# Patient Record
Sex: FEMALE | Race: BLACK OR AFRICAN AMERICAN | NOT HISPANIC OR LATINO | URBAN - METROPOLITAN AREA
[De-identification: names, ages, dates, MRNs, and addresses within clinical notes are randomized per-mention and may not be internally consistent; named-entity substitution may affect disease eponyms.]

---

## 2017-03-14 ENCOUNTER — INPATIENT (INPATIENT)
Facility: HOSPITAL | Age: 82
LOS: 7 days | Discharge: NURSING, REHAB OR LONG-TERM CARE | End: 2017-03-22
Attending: HOSPITALIST | Admitting: HOSPITALIST
Payer: MEDICAID

## 2017-03-14 VITALS
RESPIRATION RATE: 20 BRPM | SYSTOLIC BLOOD PRESSURE: 170 MMHG | OXYGEN SATURATION: 100 % | DIASTOLIC BLOOD PRESSURE: 105 MMHG | HEART RATE: 59 BPM | TEMPERATURE: 90 F

## 2017-03-14 DIAGNOSIS — Z41.8 ENCOUNTER FOR OTHER PROCEDURES FOR PURPOSES OTHER THAN REMEDYING HEALTH STATE: ICD-10-CM

## 2017-03-14 DIAGNOSIS — T68.XXXA HYPOTHERMIA, INITIAL ENCOUNTER: ICD-10-CM

## 2017-03-14 DIAGNOSIS — G93.41 METABOLIC ENCEPHALOPATHY: ICD-10-CM

## 2017-03-14 PROBLEM — Z00.00 ENCOUNTER FOR PREVENTIVE HEALTH EXAMINATION: Status: ACTIVE | Noted: 2017-03-14

## 2017-03-14 LAB
ALBUMIN SERPL ELPH-MCNC: 3.7 G/DL — SIGNIFICANT CHANGE UP (ref 3.3–5)
ALP SERPL-CCNC: 120 U/L — SIGNIFICANT CHANGE UP (ref 40–120)
ALT FLD-CCNC: 53 U/L — HIGH (ref 4–33)
AMPHET UR-MCNC: NEGATIVE — SIGNIFICANT CHANGE UP
AMPHET UR-MCNC: NEGATIVE — SIGNIFICANT CHANGE UP
APAP SERPL-MCNC: < 15 UG/ML — LOW (ref 15–25)
APPEARANCE UR: CLEAR — SIGNIFICANT CHANGE UP
AST SERPL-CCNC: 51 U/L — HIGH (ref 4–32)
B PERT DNA SPEC QL NAA+PROBE: SIGNIFICANT CHANGE UP
BACTERIA # UR AUTO: SIGNIFICANT CHANGE UP
BARBITURATES MEASUREMENT: NEGATIVE — SIGNIFICANT CHANGE UP
BARBITURATES UR SCN-MCNC: NEGATIVE — SIGNIFICANT CHANGE UP
BARBITURATES UR SCN-MCNC: NEGATIVE — SIGNIFICANT CHANGE UP
BASE EXCESS BLDA CALC-SCNC: 5.3 MMOL/L — SIGNIFICANT CHANGE UP
BASE EXCESS BLDV CALC-SCNC: 5.4 MMOL/L — SIGNIFICANT CHANGE UP
BASOPHILS # BLD AUTO: 0 K/UL — SIGNIFICANT CHANGE UP (ref 0–0.2)
BASOPHILS NFR BLD AUTO: 0 % — SIGNIFICANT CHANGE UP (ref 0–2)
BENZODIAZ SERPL-MCNC: NEGATIVE — SIGNIFICANT CHANGE UP
BENZODIAZ UR-MCNC: NEGATIVE — SIGNIFICANT CHANGE UP
BENZODIAZ UR-MCNC: NEGATIVE — SIGNIFICANT CHANGE UP
BILIRUB SERPL-MCNC: 0.2 MG/DL — SIGNIFICANT CHANGE UP (ref 0.2–1.2)
BILIRUB UR-MCNC: NEGATIVE — SIGNIFICANT CHANGE UP
BLOOD GAS VENOUS - CREATININE: 0.62 MG/DL — SIGNIFICANT CHANGE UP (ref 0.5–1.3)
BLOOD UR QL VISUAL: HIGH
BUN SERPL-MCNC: 25 MG/DL — HIGH (ref 7–23)
BUN SERPL-MCNC: 25 MG/DL — HIGH (ref 7–23)
C PNEUM DNA SPEC QL NAA+PROBE: NOT DETECTED — SIGNIFICANT CHANGE UP
CA-I BLDA-SCNC: 1.29 MMOL/L — SIGNIFICANT CHANGE UP (ref 1.15–1.29)
CALCIUM SERPL-MCNC: 9.5 MG/DL — SIGNIFICANT CHANGE UP (ref 8.4–10.5)
CALCIUM SERPL-MCNC: 9.5 MG/DL — SIGNIFICANT CHANGE UP (ref 8.4–10.5)
CANNABINOIDS UR-MCNC: NEGATIVE — SIGNIFICANT CHANGE UP
CANNABINOIDS UR-MCNC: NEGATIVE — SIGNIFICANT CHANGE UP
CHLORIDE BLDV-SCNC: 110 MMOL/L — HIGH (ref 96–108)
CHLORIDE SERPL-SCNC: 111 MMOL/L — HIGH (ref 98–107)
CHLORIDE SERPL-SCNC: 113 MMOL/L — HIGH (ref 98–107)
CK SERPL-CCNC: 101 U/L — SIGNIFICANT CHANGE UP (ref 25–170)
CO2 SERPL-SCNC: 23 MMOL/L — SIGNIFICANT CHANGE UP (ref 22–31)
CO2 SERPL-SCNC: 26 MMOL/L — SIGNIFICANT CHANGE UP (ref 22–31)
COCAINE METAB.OTHER UR-MCNC: NEGATIVE — SIGNIFICANT CHANGE UP
COCAINE METAB.OTHER UR-MCNC: NEGATIVE — SIGNIFICANT CHANGE UP
COLOR SPEC: SIGNIFICANT CHANGE UP
CORTIS SERPL-MCNC: 14.2 UG/DL — SIGNIFICANT CHANGE UP (ref 2.7–18.4)
CORTIS SERPL-MCNC: 19.7 UG/DL — HIGH (ref 2.7–18.4)
CREAT SERPL-MCNC: 0.67 MG/DL — SIGNIFICANT CHANGE UP (ref 0.5–1.3)
CREAT SERPL-MCNC: 0.71 MG/DL — SIGNIFICANT CHANGE UP (ref 0.5–1.3)
EOSINOPHIL # BLD AUTO: 0.01 K/UL — SIGNIFICANT CHANGE UP (ref 0–0.5)
EOSINOPHIL NFR BLD AUTO: 0.3 % — SIGNIFICANT CHANGE UP (ref 0–6)
ETHANOL BLD-MCNC: < 10 MG/DL — SIGNIFICANT CHANGE UP
FLUAV H1 2009 PAND RNA SPEC QL NAA+PROBE: NOT DETECTED — SIGNIFICANT CHANGE UP
FLUAV H1 RNA SPEC QL NAA+PROBE: NOT DETECTED — SIGNIFICANT CHANGE UP
FLUAV H3 RNA SPEC QL NAA+PROBE: NOT DETECTED — SIGNIFICANT CHANGE UP
FLUAV SUBTYP SPEC NAA+PROBE: SIGNIFICANT CHANGE UP
FLUBV RNA SPEC QL NAA+PROBE: NOT DETECTED — SIGNIFICANT CHANGE UP
GAS PNL BLDV: 147 MMOL/L — HIGH (ref 136–146)
GLUCOSE BLDA-MCNC: 148 MG/DL — HIGH (ref 70–99)
GLUCOSE BLDV-MCNC: 127 — HIGH (ref 70–99)
GLUCOSE SERPL-MCNC: 124 MG/DL — HIGH (ref 70–99)
GLUCOSE SERPL-MCNC: 136 MG/DL — HIGH (ref 70–99)
GLUCOSE UR-MCNC: 1000 — HIGH
GRAN CASTS # UR COMP ASSIST: SIGNIFICANT CHANGE UP
HADV DNA SPEC QL NAA+PROBE: NOT DETECTED — SIGNIFICANT CHANGE UP
HCO3 BLDA-SCNC: 28 MMOL/L — HIGH (ref 22–26)
HCO3 BLDV-SCNC: 27 MMOL/L — SIGNIFICANT CHANGE UP (ref 20–27)
HCOV 229E RNA SPEC QL NAA+PROBE: NOT DETECTED — SIGNIFICANT CHANGE UP
HCOV HKU1 RNA SPEC QL NAA+PROBE: NOT DETECTED — SIGNIFICANT CHANGE UP
HCOV NL63 RNA SPEC QL NAA+PROBE: NOT DETECTED — SIGNIFICANT CHANGE UP
HCOV OC43 RNA SPEC QL NAA+PROBE: NOT DETECTED — SIGNIFICANT CHANGE UP
HCT VFR BLD CALC: 44.9 % — SIGNIFICANT CHANGE UP (ref 34.5–45)
HCT VFR BLDA CALC: 39.4 % — SIGNIFICANT CHANGE UP (ref 34.5–46.5)
HCT VFR BLDV CALC: 44.9 % — SIGNIFICANT CHANGE UP (ref 34.5–45)
HGB BLD-MCNC: 14.6 G/DL — SIGNIFICANT CHANGE UP (ref 11.5–15.5)
HGB BLDA-MCNC: 12.8 G/DL — SIGNIFICANT CHANGE UP (ref 11.5–15.5)
HGB BLDV-MCNC: 14.6 G/DL — SIGNIFICANT CHANGE UP (ref 11.5–15.5)
HMPV RNA SPEC QL NAA+PROBE: NOT DETECTED — SIGNIFICANT CHANGE UP
HPIV1 RNA SPEC QL NAA+PROBE: NOT DETECTED — SIGNIFICANT CHANGE UP
HPIV2 RNA SPEC QL NAA+PROBE: NOT DETECTED — SIGNIFICANT CHANGE UP
HPIV3 RNA SPEC QL NAA+PROBE: NOT DETECTED — SIGNIFICANT CHANGE UP
HPIV4 RNA SPEC QL NAA+PROBE: NOT DETECTED — SIGNIFICANT CHANGE UP
HYALINE CASTS # UR AUTO: SIGNIFICANT CHANGE UP (ref 0–?)
IMM GRANULOCYTES NFR BLD AUTO: 0.3 % — SIGNIFICANT CHANGE UP (ref 0–1.5)
KETONES UR-MCNC: NEGATIVE — SIGNIFICANT CHANGE UP
LACTATE BLDA-SCNC: 1.1 MMOL/L — SIGNIFICANT CHANGE UP (ref 0.5–2)
LACTATE BLDV-MCNC: 1.7 MMOL/L — SIGNIFICANT CHANGE UP (ref 0.5–2)
LEUKOCYTE ESTERASE UR-ACNC: NEGATIVE — SIGNIFICANT CHANGE UP
LYMPHOCYTES # BLD AUTO: 0.43 K/UL — LOW (ref 1–3.3)
LYMPHOCYTES # BLD AUTO: 13 % — SIGNIFICANT CHANGE UP (ref 13–44)
M PNEUMO DNA SPEC QL NAA+PROBE: NOT DETECTED — SIGNIFICANT CHANGE UP
MCHC RBC-ENTMCNC: 28.7 PG — SIGNIFICANT CHANGE UP (ref 27–34)
MCHC RBC-ENTMCNC: 32.5 % — SIGNIFICANT CHANGE UP (ref 32–36)
MCV RBC AUTO: 88.4 FL — SIGNIFICANT CHANGE UP (ref 80–100)
METHADONE UR-MCNC: NEGATIVE — SIGNIFICANT CHANGE UP
METHADONE UR-MCNC: NEGATIVE — SIGNIFICANT CHANGE UP
MONOCYTES # BLD AUTO: 0.17 K/UL — SIGNIFICANT CHANGE UP (ref 0–0.9)
MONOCYTES NFR BLD AUTO: 5.1 % — SIGNIFICANT CHANGE UP (ref 2–14)
MUCOUS THREADS # UR AUTO: SIGNIFICANT CHANGE UP
NEUTROPHILS # BLD AUTO: 2.69 K/UL — SIGNIFICANT CHANGE UP (ref 1.8–7.4)
NEUTROPHILS NFR BLD AUTO: 81.3 % — HIGH (ref 43–77)
NITRITE UR-MCNC: NEGATIVE — SIGNIFICANT CHANGE UP
NON-SQ EPI CELLS # UR AUTO: <1 — SIGNIFICANT CHANGE UP
OPIATES UR-MCNC: NEGATIVE — SIGNIFICANT CHANGE UP
OPIATES UR-MCNC: NEGATIVE — SIGNIFICANT CHANGE UP
OXYCODONE UR-MCNC: NEGATIVE — SIGNIFICANT CHANGE UP
OXYCODONE UR-MCNC: NEGATIVE — SIGNIFICANT CHANGE UP
PCO2 BLDA: 60 MMHG — HIGH (ref 32–48)
PCO2 BLDV: 72 MMHG — HIGH (ref 41–51)
PCP UR-MCNC: NEGATIVE — SIGNIFICANT CHANGE UP
PCP UR-MCNC: NEGATIVE — SIGNIFICANT CHANGE UP
PH BLDA: 7.33 PH — LOW (ref 7.35–7.45)
PH BLDV: 7.27 PH — LOW (ref 7.32–7.43)
PH UR: 6.5 — SIGNIFICANT CHANGE UP (ref 4.6–8)
PLATELET # BLD AUTO: 127 K/UL — LOW (ref 150–400)
PMV BLD: 11.6 FL — SIGNIFICANT CHANGE UP (ref 7–13)
PO2 BLDA: 169 MMHG — HIGH (ref 83–108)
PO2 BLDV: 52 MMHG — HIGH (ref 35–40)
POTASSIUM BLDA-SCNC: 4.7 MMOL/L — HIGH (ref 3.4–4.5)
POTASSIUM BLDV-SCNC: 7.9 MMOL/L — CRITICAL HIGH (ref 3.4–4.5)
POTASSIUM SERPL-MCNC: 5.1 MMOL/L — SIGNIFICANT CHANGE UP (ref 3.5–5.3)
POTASSIUM SERPL-MCNC: 6.4 MMOL/L — CRITICAL HIGH (ref 3.5–5.3)
POTASSIUM SERPL-SCNC: 5.1 MMOL/L — SIGNIFICANT CHANGE UP (ref 3.5–5.3)
POTASSIUM SERPL-SCNC: 6.4 MMOL/L — CRITICAL HIGH (ref 3.5–5.3)
PROT SERPL-MCNC: 7.2 G/DL — SIGNIFICANT CHANGE UP (ref 6–8.3)
PROT UR-MCNC: >600 — SIGNIFICANT CHANGE UP
RBC # BLD: 5.08 M/UL — SIGNIFICANT CHANGE UP (ref 3.8–5.2)
RBC # FLD: 13.3 % — SIGNIFICANT CHANGE UP (ref 10.3–14.5)
RBC CASTS # UR COMP ASSIST: SIGNIFICANT CHANGE UP (ref 0–?)
RSV RNA SPEC QL NAA+PROBE: NOT DETECTED — SIGNIFICANT CHANGE UP
RV+EV RNA SPEC QL NAA+PROBE: NOT DETECTED — SIGNIFICANT CHANGE UP
SALICYLATES SERPL-MCNC: < 5 MG/DL — LOW (ref 15–30)
SAO2 % BLDA: 99.1 % — HIGH (ref 95–99)
SAO2 % BLDV: 80.6 % — SIGNIFICANT CHANGE UP (ref 60–85)
SODIUM BLDA-SCNC: 143 MMOL/L — SIGNIFICANT CHANGE UP (ref 136–146)
SODIUM SERPL-SCNC: 148 MMOL/L — HIGH (ref 135–145)
SODIUM SERPL-SCNC: 150 MMOL/L — HIGH (ref 135–145)
SP GR SPEC: 1.02 — SIGNIFICANT CHANGE UP (ref 1–1.03)
SQUAMOUS # UR AUTO: SIGNIFICANT CHANGE UP
T3 SERPL-MCNC: 81.6 NG/DL — SIGNIFICANT CHANGE UP (ref 80–200)
T4 AB SER-ACNC: 5.34 UG/DL — SIGNIFICANT CHANGE UP (ref 5.1–13)
TSH SERPL-MCNC: 3.16 UIU/ML — SIGNIFICANT CHANGE UP (ref 0.27–4.2)
UROBILINOGEN FLD QL: NORMAL E.U. — SIGNIFICANT CHANGE UP (ref 0.1–0.2)
WBC # BLD: 3.31 K/UL — LOW (ref 3.8–10.5)
WBC # FLD AUTO: 3.31 K/UL — LOW (ref 3.8–10.5)
WBC UR QL: SIGNIFICANT CHANGE UP (ref 0–?)
YEAST BUDDING # UR COMP ASSIST: SIGNIFICANT CHANGE UP

## 2017-03-14 PROCEDURE — 71010: CPT | Mod: 26

## 2017-03-14 PROCEDURE — 70450 CT HEAD/BRAIN W/O DYE: CPT | Mod: 26

## 2017-03-14 RX ORDER — NOREPINEPHRINE BITARTRATE/D5W 8 MG/250ML
0.1 PLASTIC BAG, INJECTION (ML) INTRAVENOUS
Qty: 8 | Refills: 0 | Status: DISCONTINUED | OUTPATIENT
Start: 2017-03-14 | End: 2017-03-14

## 2017-03-14 RX ORDER — NOREPINEPHRINE BITARTRATE/D5W 8 MG/250ML
0.1 PLASTIC BAG, INJECTION (ML) INTRAVENOUS
Qty: 8 | Refills: 0 | Status: DISCONTINUED | OUTPATIENT
Start: 2017-03-14 | End: 2017-03-15

## 2017-03-14 RX ORDER — ENOXAPARIN SODIUM 100 MG/ML
40 INJECTION SUBCUTANEOUS DAILY
Qty: 0 | Refills: 0 | Status: DISCONTINUED | OUTPATIENT
Start: 2017-03-14 | End: 2017-03-15

## 2017-03-14 RX ORDER — VANCOMYCIN HCL 1 G
1000 VIAL (EA) INTRAVENOUS ONCE
Qty: 0 | Refills: 0 | Status: COMPLETED | OUTPATIENT
Start: 2017-03-14 | End: 2017-03-14

## 2017-03-14 RX ORDER — INFLUENZA VIRUS VACCINE 15; 15; 15; 15 UG/.5ML; UG/.5ML; UG/.5ML; UG/.5ML
0.5 SUSPENSION INTRAMUSCULAR ONCE
Qty: 0 | Refills: 0 | Status: DISCONTINUED | OUTPATIENT
Start: 2017-03-14 | End: 2017-03-16

## 2017-03-14 RX ORDER — SODIUM CHLORIDE 9 MG/ML
1000 INJECTION, SOLUTION INTRAVENOUS
Qty: 0 | Refills: 0 | Status: DISCONTINUED | OUTPATIENT
Start: 2017-03-14 | End: 2017-03-15

## 2017-03-14 RX ORDER — PIPERACILLIN AND TAZOBACTAM 4; .5 G/20ML; G/20ML
3.38 INJECTION, POWDER, LYOPHILIZED, FOR SOLUTION INTRAVENOUS ONCE
Qty: 0 | Refills: 0 | Status: COMPLETED | OUTPATIENT
Start: 2017-03-14 | End: 2017-03-14

## 2017-03-14 RX ORDER — PIPERACILLIN AND TAZOBACTAM 4; .5 G/20ML; G/20ML
3.38 INJECTION, POWDER, LYOPHILIZED, FOR SOLUTION INTRAVENOUS EVERY 8 HOURS
Qty: 0 | Refills: 0 | Status: DISCONTINUED | OUTPATIENT
Start: 2017-03-14 | End: 2017-03-18

## 2017-03-14 RX ORDER — VANCOMYCIN HCL 1 G
1000 VIAL (EA) INTRAVENOUS EVERY 24 HOURS
Qty: 0 | Refills: 0 | Status: DISCONTINUED | OUTPATIENT
Start: 2017-03-15 | End: 2017-03-18

## 2017-03-14 RX ORDER — SODIUM CHLORIDE 9 MG/ML
500 INJECTION INTRAMUSCULAR; INTRAVENOUS; SUBCUTANEOUS ONCE
Qty: 0 | Refills: 0 | Status: COMPLETED | OUTPATIENT
Start: 2017-03-14 | End: 2017-03-14

## 2017-03-14 RX ORDER — SODIUM CHLORIDE 9 MG/ML
1000 INJECTION INTRAMUSCULAR; INTRAVENOUS; SUBCUTANEOUS ONCE
Qty: 0 | Refills: 0 | Status: COMPLETED | OUTPATIENT
Start: 2017-03-14 | End: 2017-03-14

## 2017-03-14 RX ADMIN — PIPERACILLIN AND TAZOBACTAM 200 GRAM(S): 4; .5 INJECTION, POWDER, LYOPHILIZED, FOR SOLUTION INTRAVENOUS at 12:41

## 2017-03-14 RX ADMIN — Medication 10.16 MICROGRAM(S)/KG/MIN: at 23:32

## 2017-03-14 RX ADMIN — Medication 250 MILLIGRAM(S): at 14:15

## 2017-03-14 RX ADMIN — SODIUM CHLORIDE 75 MILLILITER(S): 9 INJECTION, SOLUTION INTRAVENOUS at 19:31

## 2017-03-14 RX ADMIN — SODIUM CHLORIDE 75 MILLILITER(S): 9 INJECTION, SOLUTION INTRAVENOUS at 23:33

## 2017-03-14 RX ADMIN — PIPERACILLIN AND TAZOBACTAM 25 GRAM(S): 4; .5 INJECTION, POWDER, LYOPHILIZED, FOR SOLUTION INTRAVENOUS at 23:32

## 2017-03-14 RX ADMIN — SODIUM CHLORIDE 1000 MILLILITER(S): 9 INJECTION INTRAMUSCULAR; INTRAVENOUS; SUBCUTANEOUS at 17:08

## 2017-03-14 RX ADMIN — SODIUM CHLORIDE 500 MILLILITER(S): 9 INJECTION INTRAMUSCULAR; INTRAVENOUS; SUBCUTANEOUS at 12:41

## 2017-03-14 RX ADMIN — ENOXAPARIN SODIUM 40 MILLIGRAM(S): 100 INJECTION SUBCUTANEOUS at 23:34

## 2017-03-14 NOTE — ED PROVIDER NOTE - ATTENDING CONTRIBUTION TO CARE
DR Bloch- patient brought in with AMS, no medical hx except chronic constipation, Last see okay yeasterday. No fever as per son, no c/o. Bp190/80, pulse 58 with rectal temp 86. Awake, following directions, mumbling answers to questions. Motor intact, pulses 2 + and equal, heart sounds nml lungs clear, abd soft nontender, no organomegaly- Plan ct head , warm, labs, culture antibiotics , consider occult sepsis, hypothyroidism.

## 2017-03-14 NOTE — ED ADULT NURSE NOTE - OBJECTIVE STATEMENT
pt BIBEMS responsive to only pain and loud verbal. as per son, patient is normally ambulatory and awake alert X4. only PMH as per son is DM. LNW yesterday at 1400. patient presents today AMS, maintaining own airway, cold to touch, no resp distress, no NVD, LS clear = bilat. EMS stated that pt had L sided weakness however upon arrival to ED pt is able to move all extremities upon command (loud verbal) and appears to have strong , no facial droop noted however pt is aphasic.  Bilateral 20 g IV placed, rectal remp 84.5, critical ochoa inserted. will CTM.

## 2017-03-14 NOTE — ED PROVIDER NOTE - NEUROLOGICAL, MLM
Pt able to follow commands, move all extremities, no focal deficits, no motor or sensory deficits noted on exam.

## 2017-03-14 NOTE — ED ADULT NURSE REASSESSMENT NOTE - NS ED NURSE REASSESS COMMENT FT1
Patient left unit at this time. RN, ED Tech, and family transporting with patient. Patient in NAD at this time.

## 2017-03-14 NOTE — ED PROVIDER NOTE - NOTES
neuro paged for consult neuro resident will see patient in ED, ICU also consulted regarding patients status

## 2017-03-14 NOTE — ED PROVIDER NOTE - PROGRESS NOTE DETAILS
JEAN Mendoza: called from blood gas regarding pts VBG, K 7.9. Reviewed EKG with Dr. Bloch no evidence of hyperkalemia on EKG. Will await CMP. Likely hemolyzed specimen. DR Bloch- KELSY 4.7 on ABG, Noed pco2 72, ABG 60, with pH of 7.33, appears acute on chronic resp acidosis. Concern for myxedema, , discussed with ICU,  will wait for TSH for hydrocortisone and treatment if hypothyroid. Continuing to rewarm , went up 1 degree celcius in 1 hour. JEAN Mendoza : MAR and Alice PENA txt paged. JEAN Mendoza : MAR and Tele PA txt paged. MAR phone paged at [phone] JEAN Mendoza: pt admitted to tele service JEAN Mendoza on discussion with ICU and admin patient better suited for ICU at this point given hypothermia and beir hugger. Pt accepted to ICU

## 2017-03-14 NOTE — H&P ADULT. - RS GEN PE MLT RESP DETAILS PC
respirations non-labored/clear to auscultation bilaterally/good air movement/breath sounds equal/airway patent

## 2017-03-14 NOTE — H&P ADULT. - HISTORY OF PRESENT ILLNESS
86 F pmhx of headaches and constipation presenting with AMS. History was obtained from son as bedside. Patient was in her usual state of health, until a few days ago when family noticed patient had a slurred speech. The speech impairment resolved on its own, so family did not bring the patient to the hospital. Patient returned to her baseline. At baseline, patient is quite active and ambulatory. This morning, she woke up with confusion. As per son, patient was acting erratic. Speech was mumbled. Patient was making statements such as "I want to be cremated." 911 was called and patient was brought to the ED.   In the ED, T 89.9, /105 HR 59 RR 16 SpO2 100 RA. In the ED, patient was given vancomycin and zosyn and 1.5 L bolus. Due to hypothermia, patient was admitted to the MICU.

## 2017-03-14 NOTE — H&P ADULT. - PROBLEM SELECTOR PLAN 1
likely secondary to infectious process. UA neg, CXR neg for PNA. RVP neg  - Blood and urine cultures pending  - Broad antibiotic coverage: vancomycin and zosyn  - MRI head pending   - Urine legionella pending  - LR maintanence fluids likely secondary to infectious process vs. CVA. UA neg, CXR neg for PNA. RVP neg  - Blood and urine cultures pending  - Broad antibiotic coverage: vancomycin and zosyn  - MRI head pending   - Urine legionella pending  - LR fluids

## 2017-03-14 NOTE — H&P ADULT. - ASSESSMENT
86 F pmhx of headaches and constipation presenting with metabolic encephalopathy, hypothermia, bradycardia likely secondary to infectious process. UA neg, CXR neg for PNA.

## 2017-03-14 NOTE — ED PROVIDER NOTE - OBJECTIVE STATEMENT
87 yo F PMHx of headaches, constipation, here for AMS. Pt brought in by EMS with son at bedside giving history. Son reports that yesterday patient was at baseline, however when he went to wake her this morning she was confused saying things like "I want to be cremated". Called 911 and brought to ED for eval. Son denies travel, recent infections, or sick contacts. Denies hx of UTI or PNA.  Follows with PMD last seen in summer 2016. Denies recent fever, vomiting, diarrhea.

## 2017-03-14 NOTE — H&P ADULT. - PROBLEM SELECTOR PLAN 2
likely secondary to sepsis 2/2 infectious process. Concern for myxedema, however TSH wnl   - Will continue to monitor  - Hyperthermia blanket  - T3 pending

## 2017-03-14 NOTE — ED ADULT NURSE REASSESSMENT NOTE - NS ED NURSE REASSESS COMMENT FT1
pt appears to be not improving s/p 2nd maalox, pt does not wish to eat states she is too naueated. Dr Bloch aware

## 2017-03-14 NOTE — ED PROVIDER NOTE - MEDICAL DECISION MAKING DETAILS
87 yo F pmhx of constipation and headaches here for AMS. Found to have rectal temp of 86.9 in ED.  Code sepsis called. Will send labs and tx empirically and reassess.   PLAN: labs, CT, CXR, urine, abx.

## 2017-03-15 LAB
ALBUMIN SERPL ELPH-MCNC: 3.2 G/DL — LOW (ref 3.3–5)
ALP SERPL-CCNC: 99 U/L — SIGNIFICANT CHANGE UP (ref 40–120)
ALT FLD-CCNC: 40 U/L — HIGH (ref 4–33)
AST SERPL-CCNC: 37 U/L — HIGH (ref 4–32)
BASOPHILS # BLD AUTO: 0 K/UL — SIGNIFICANT CHANGE UP (ref 0–0.2)
BASOPHILS NFR BLD AUTO: 0 % — SIGNIFICANT CHANGE UP (ref 0–2)
BILIRUB SERPL-MCNC: 0.2 MG/DL — SIGNIFICANT CHANGE UP (ref 0.2–1.2)
BUN SERPL-MCNC: 24 MG/DL — HIGH (ref 7–23)
BUN SERPL-MCNC: 28 MG/DL — HIGH (ref 7–23)
CALCIUM SERPL-MCNC: 9 MG/DL — SIGNIFICANT CHANGE UP (ref 8.4–10.5)
CALCIUM SERPL-MCNC: 9.4 MG/DL — SIGNIFICANT CHANGE UP (ref 8.4–10.5)
CHLORIDE SERPL-SCNC: 115 MMOL/L — HIGH (ref 98–107)
CHLORIDE SERPL-SCNC: 116 MMOL/L — HIGH (ref 98–107)
CK MB BLD-MCNC: 8.97 NG/ML — HIGH (ref 1–4.7)
CK MB BLD-MCNC: 9.98 NG/ML — HIGH (ref 1–4.7)
CK MB BLD-MCNC: SIGNIFICANT CHANGE UP (ref 0–2.5)
CK SERPL-CCNC: 67 U/L — SIGNIFICANT CHANGE UP (ref 25–170)
CK SERPL-CCNC: 91 U/L — SIGNIFICANT CHANGE UP (ref 25–170)
CO2 SERPL-SCNC: 21 MMOL/L — LOW (ref 22–31)
CO2 SERPL-SCNC: 27 MMOL/L — SIGNIFICANT CHANGE UP (ref 22–31)
CREAT SERPL-MCNC: 1.11 MG/DL — SIGNIFICANT CHANGE UP (ref 0.5–1.3)
CREAT SERPL-MCNC: 1.23 MG/DL — SIGNIFICANT CHANGE UP (ref 0.5–1.3)
EOSINOPHIL # BLD AUTO: 0.01 K/UL — SIGNIFICANT CHANGE UP (ref 0–0.5)
EOSINOPHIL NFR BLD AUTO: 0.3 % — SIGNIFICANT CHANGE UP (ref 0–6)
GLUCOSE SERPL-MCNC: 72 MG/DL — SIGNIFICANT CHANGE UP (ref 70–99)
GLUCOSE SERPL-MCNC: 76 MG/DL — SIGNIFICANT CHANGE UP (ref 70–99)
HCT VFR BLD CALC: 37.3 % — SIGNIFICANT CHANGE UP (ref 34.5–45)
HGB BLD-MCNC: 12 G/DL — SIGNIFICANT CHANGE UP (ref 11.5–15.5)
IMM GRANULOCYTES NFR BLD AUTO: 0 % — SIGNIFICANT CHANGE UP (ref 0–1.5)
INR BLD: 1.08 — SIGNIFICANT CHANGE UP (ref 0.87–1.18)
LYMPHOCYTES # BLD AUTO: 0.37 K/UL — LOW (ref 1–3.3)
LYMPHOCYTES # BLD AUTO: 9.5 % — LOW (ref 13–44)
MAGNESIUM SERPL-MCNC: 1.9 MG/DL — SIGNIFICANT CHANGE UP (ref 1.6–2.6)
MCHC RBC-ENTMCNC: 28.4 PG — SIGNIFICANT CHANGE UP (ref 27–34)
MCHC RBC-ENTMCNC: 32.2 % — SIGNIFICANT CHANGE UP (ref 32–36)
MCV RBC AUTO: 88.4 FL — SIGNIFICANT CHANGE UP (ref 80–100)
MONOCYTES # BLD AUTO: 0.35 K/UL — SIGNIFICANT CHANGE UP (ref 0–0.9)
MONOCYTES NFR BLD AUTO: 9 % — SIGNIFICANT CHANGE UP (ref 2–14)
NEUTROPHILS # BLD AUTO: 3.15 K/UL — SIGNIFICANT CHANGE UP (ref 1.8–7.4)
NEUTROPHILS NFR BLD AUTO: 81.2 % — HIGH (ref 43–77)
PHOSPHATE SERPL-MCNC: 2.9 MG/DL — SIGNIFICANT CHANGE UP (ref 2.5–4.5)
PLATELET # BLD AUTO: 125 K/UL — LOW (ref 150–400)
PMV BLD: 12.5 FL — SIGNIFICANT CHANGE UP (ref 7–13)
POTASSIUM SERPL-MCNC: 4.8 MMOL/L — SIGNIFICANT CHANGE UP (ref 3.5–5.3)
POTASSIUM SERPL-MCNC: 5.7 MMOL/L — HIGH (ref 3.5–5.3)
POTASSIUM SERPL-SCNC: 4.8 MMOL/L — SIGNIFICANT CHANGE UP (ref 3.5–5.3)
POTASSIUM SERPL-SCNC: 5.7 MMOL/L — HIGH (ref 3.5–5.3)
PROT SERPL-MCNC: 5.8 G/DL — LOW (ref 6–8.3)
PROTHROM AB SERPL-ACNC: 12.3 SEC — SIGNIFICANT CHANGE UP (ref 10–13.1)
RBC # BLD: 4.22 M/UL — SIGNIFICANT CHANGE UP (ref 3.8–5.2)
RBC # FLD: 13.7 % — SIGNIFICANT CHANGE UP (ref 10.3–14.5)
SODIUM SERPL-SCNC: 149 MMOL/L — HIGH (ref 135–145)
SODIUM SERPL-SCNC: 152 MMOL/L — HIGH (ref 135–145)
SPECIMEN SOURCE: SIGNIFICANT CHANGE UP
TROPONIN T SERPL-MCNC: < 0.06 NG/ML — SIGNIFICANT CHANGE UP (ref 0–0.06)
TROPONIN T SERPL-MCNC: < 0.06 NG/ML — SIGNIFICANT CHANGE UP (ref 0–0.06)
VANCOMYCIN FLD-MCNC: 8.4 UG/ML — SIGNIFICANT CHANGE UP
WBC # BLD: 3.88 K/UL — SIGNIFICANT CHANGE UP (ref 3.8–10.5)
WBC # FLD AUTO: 3.88 K/UL — SIGNIFICANT CHANGE UP (ref 3.8–10.5)

## 2017-03-15 PROCEDURE — 71010: CPT | Mod: 26

## 2017-03-15 PROCEDURE — 99222 1ST HOSP IP/OBS MODERATE 55: CPT | Mod: GC

## 2017-03-15 PROCEDURE — 99233 SBSQ HOSP IP/OBS HIGH 50: CPT | Mod: GC

## 2017-03-15 RX ORDER — DEXTROSE 50 % IN WATER 50 %
50 SYRINGE (ML) INTRAVENOUS ONCE
Qty: 0 | Refills: 0 | Status: COMPLETED | OUTPATIENT
Start: 2017-03-15 | End: 2017-03-15

## 2017-03-15 RX ORDER — INSULIN HUMAN 100 [IU]/ML
10 INJECTION, SOLUTION SUBCUTANEOUS ONCE
Qty: 0 | Refills: 0 | Status: COMPLETED | OUTPATIENT
Start: 2017-03-15 | End: 2017-03-15

## 2017-03-15 RX ORDER — SODIUM CHLORIDE 9 MG/ML
1000 INJECTION, SOLUTION INTRAVENOUS
Qty: 0 | Refills: 0 | Status: DISCONTINUED | OUTPATIENT
Start: 2017-03-15 | End: 2017-03-15

## 2017-03-15 RX ORDER — IPRATROPIUM/ALBUTEROL SULFATE 18-103MCG
3 AEROSOL WITH ADAPTER (GRAM) INHALATION ONCE
Qty: 0 | Refills: 0 | Status: COMPLETED | OUTPATIENT
Start: 2017-03-15 | End: 2017-03-15

## 2017-03-15 RX ORDER — NOREPINEPHRINE BITARTRATE/D5W 8 MG/250ML
0.1 PLASTIC BAG, INJECTION (ML) INTRAVENOUS
Qty: 8 | Refills: 0 | Status: DISCONTINUED | OUTPATIENT
Start: 2017-03-15 | End: 2017-03-15

## 2017-03-15 RX ORDER — DEXTROSE 50 % IN WATER 50 %
10 SYRINGE (ML) INTRAVENOUS ONCE
Qty: 0 | Refills: 0 | Status: DISCONTINUED | OUTPATIENT
Start: 2017-03-15 | End: 2017-03-15

## 2017-03-15 RX ORDER — CALCIUM GLUCONATE 100 MG/ML
1 VIAL (ML) INTRAVENOUS ONCE
Qty: 0 | Refills: 0 | Status: COMPLETED | OUTPATIENT
Start: 2017-03-15 | End: 2017-03-15

## 2017-03-15 RX ORDER — HEPARIN SODIUM 5000 [USP'U]/ML
5000 INJECTION INTRAVENOUS; SUBCUTANEOUS EVERY 12 HOURS
Qty: 0 | Refills: 0 | Status: DISCONTINUED | OUTPATIENT
Start: 2017-03-15 | End: 2017-03-16

## 2017-03-15 RX ORDER — MIDODRINE HYDROCHLORIDE 2.5 MG/1
20 TABLET ORAL ONCE
Qty: 0 | Refills: 0 | Status: COMPLETED | OUTPATIENT
Start: 2017-03-15 | End: 2017-03-15

## 2017-03-15 RX ORDER — SODIUM CHLORIDE 9 MG/ML
1000 INJECTION INTRAMUSCULAR; INTRAVENOUS; SUBCUTANEOUS ONCE
Qty: 0 | Refills: 0 | Status: COMPLETED | OUTPATIENT
Start: 2017-03-15 | End: 2017-03-15

## 2017-03-15 RX ORDER — NOREPINEPHRINE BITARTRATE/D5W 8 MG/250ML
0.1 PLASTIC BAG, INJECTION (ML) INTRAVENOUS
Qty: 8 | Refills: 0 | Status: DISCONTINUED | OUTPATIENT
Start: 2017-03-15 | End: 2017-03-16

## 2017-03-15 RX ORDER — NOREPINEPHRINE BITARTRATE/D5W 8 MG/250ML
0.1 PLASTIC BAG, INJECTION (ML) INTRAVENOUS
Qty: 16 | Refills: 0 | Status: DISCONTINUED | OUTPATIENT
Start: 2017-03-15 | End: 2017-03-15

## 2017-03-15 RX ADMIN — SODIUM CHLORIDE 1000 MILLILITER(S): 9 INJECTION INTRAMUSCULAR; INTRAVENOUS; SUBCUTANEOUS at 11:10

## 2017-03-15 RX ADMIN — Medication 250 MILLIGRAM(S): at 15:14

## 2017-03-15 RX ADMIN — Medication 3 MILLILITER(S): at 06:52

## 2017-03-15 RX ADMIN — Medication 200 GRAM(S): at 06:39

## 2017-03-15 RX ADMIN — Medication 50 MILLILITER(S): at 19:47

## 2017-03-15 RX ADMIN — INSULIN HUMAN 10 UNIT(S): 100 INJECTION, SOLUTION SUBCUTANEOUS at 06:39

## 2017-03-15 RX ADMIN — PIPERACILLIN AND TAZOBACTAM 25 GRAM(S): 4; .5 INJECTION, POWDER, LYOPHILIZED, FOR SOLUTION INTRAVENOUS at 13:29

## 2017-03-15 RX ADMIN — HEPARIN SODIUM 5000 UNIT(S): 5000 INJECTION INTRAVENOUS; SUBCUTANEOUS at 17:56

## 2017-03-15 RX ADMIN — Medication 50 MILLILITER(S): at 06:40

## 2017-03-15 RX ADMIN — PIPERACILLIN AND TAZOBACTAM 25 GRAM(S): 4; .5 INJECTION, POWDER, LYOPHILIZED, FOR SOLUTION INTRAVENOUS at 06:28

## 2017-03-15 RX ADMIN — MIDODRINE HYDROCHLORIDE 20 MILLIGRAM(S): 2.5 TABLET ORAL at 07:38

## 2017-03-15 RX ADMIN — Medication 10.16 MICROGRAM(S)/KG/MIN: at 00:00

## 2017-03-15 RX ADMIN — PIPERACILLIN AND TAZOBACTAM 25 GRAM(S): 4; .5 INJECTION, POWDER, LYOPHILIZED, FOR SOLUTION INTRAVENOUS at 21:57

## 2017-03-16 LAB
BACTERIA UR CULT: SIGNIFICANT CHANGE UP
BUN SERPL-MCNC: 18 MG/DL — SIGNIFICANT CHANGE UP (ref 7–23)
CALCIUM SERPL-MCNC: 9.9 MG/DL — SIGNIFICANT CHANGE UP (ref 8.4–10.5)
CHLORIDE SERPL-SCNC: 111 MMOL/L — HIGH (ref 98–107)
CO2 SERPL-SCNC: 25 MMOL/L — SIGNIFICANT CHANGE UP (ref 22–31)
CREAT SERPL-MCNC: 1.02 MG/DL — SIGNIFICANT CHANGE UP (ref 0.5–1.3)
GLUCOSE SERPL-MCNC: 94 MG/DL — SIGNIFICANT CHANGE UP (ref 70–99)
HCT VFR BLD CALC: 34.8 % — SIGNIFICANT CHANGE UP (ref 34.5–45)
HGB BLD-MCNC: 11.3 G/DL — LOW (ref 11.5–15.5)
MAGNESIUM SERPL-MCNC: 2 MG/DL — SIGNIFICANT CHANGE UP (ref 1.6–2.6)
MCHC RBC-ENTMCNC: 28.8 PG — SIGNIFICANT CHANGE UP (ref 27–34)
MCHC RBC-ENTMCNC: 32.5 % — SIGNIFICANT CHANGE UP (ref 32–36)
MCV RBC AUTO: 88.5 FL — SIGNIFICANT CHANGE UP (ref 80–100)
PHOSPHATE SERPL-MCNC: 2.7 MG/DL — SIGNIFICANT CHANGE UP (ref 2.5–4.5)
PLATELET # BLD AUTO: 93 K/UL — LOW (ref 150–400)
PMV BLD: 12.6 FL — SIGNIFICANT CHANGE UP (ref 7–13)
POTASSIUM SERPL-MCNC: 4.3 MMOL/L — SIGNIFICANT CHANGE UP (ref 3.5–5.3)
POTASSIUM SERPL-SCNC: 4.3 MMOL/L — SIGNIFICANT CHANGE UP (ref 3.5–5.3)
RBC # BLD: 3.93 M/UL — SIGNIFICANT CHANGE UP (ref 3.8–5.2)
RBC # FLD: 13.7 % — SIGNIFICANT CHANGE UP (ref 10.3–14.5)
SODIUM SERPL-SCNC: 145 MMOL/L — SIGNIFICANT CHANGE UP (ref 135–145)
VANCOMYCIN TROUGH SERPL-MCNC: 10.9 UG/ML — SIGNIFICANT CHANGE UP (ref 10–20)
WBC # BLD: 7.07 K/UL — SIGNIFICANT CHANGE UP (ref 3.8–10.5)
WBC # FLD AUTO: 7.07 K/UL — SIGNIFICANT CHANGE UP (ref 3.8–10.5)

## 2017-03-16 PROCEDURE — 70551 MRI BRAIN STEM W/O DYE: CPT | Mod: 26

## 2017-03-16 PROCEDURE — 99233 SBSQ HOSP IP/OBS HIGH 50: CPT | Mod: GC

## 2017-03-16 PROCEDURE — 99232 SBSQ HOSP IP/OBS MODERATE 35: CPT | Mod: GC

## 2017-03-16 RX ORDER — HEPARIN SODIUM 5000 [USP'U]/ML
5000 INJECTION INTRAVENOUS; SUBCUTANEOUS EVERY 8 HOURS
Qty: 0 | Refills: 0 | Status: DISCONTINUED | OUTPATIENT
Start: 2017-03-16 | End: 2017-03-17

## 2017-03-16 RX ORDER — DEXTROSE 50 % IN WATER 50 %
25 SYRINGE (ML) INTRAVENOUS ONCE
Qty: 0 | Refills: 0 | Status: COMPLETED | OUTPATIENT
Start: 2017-03-16 | End: 2017-03-16

## 2017-03-16 RX ADMIN — HEPARIN SODIUM 5000 UNIT(S): 5000 INJECTION INTRAVENOUS; SUBCUTANEOUS at 21:40

## 2017-03-16 RX ADMIN — PIPERACILLIN AND TAZOBACTAM 25 GRAM(S): 4; .5 INJECTION, POWDER, LYOPHILIZED, FOR SOLUTION INTRAVENOUS at 07:11

## 2017-03-16 RX ADMIN — HEPARIN SODIUM 5000 UNIT(S): 5000 INJECTION INTRAVENOUS; SUBCUTANEOUS at 07:11

## 2017-03-16 RX ADMIN — Medication 250 MILLIGRAM(S): at 14:16

## 2017-03-16 RX ADMIN — Medication 25 MILLILITER(S): at 00:15

## 2017-03-16 RX ADMIN — PIPERACILLIN AND TAZOBACTAM 25 GRAM(S): 4; .5 INJECTION, POWDER, LYOPHILIZED, FOR SOLUTION INTRAVENOUS at 21:39

## 2017-03-16 RX ADMIN — PIPERACILLIN AND TAZOBACTAM 25 GRAM(S): 4; .5 INJECTION, POWDER, LYOPHILIZED, FOR SOLUTION INTRAVENOUS at 14:17

## 2017-03-17 LAB
BASOPHILS # BLD AUTO: 0.01 K/UL — SIGNIFICANT CHANGE UP (ref 0–0.2)
BASOPHILS NFR BLD AUTO: 0.3 % — SIGNIFICANT CHANGE UP (ref 0–2)
BUN SERPL-MCNC: 25 MG/DL — HIGH (ref 7–23)
CALCIUM SERPL-MCNC: 9.7 MG/DL — SIGNIFICANT CHANGE UP (ref 8.4–10.5)
CHLORIDE SERPL-SCNC: 110 MMOL/L — HIGH (ref 98–107)
CO2 SERPL-SCNC: 26 MMOL/L — SIGNIFICANT CHANGE UP (ref 22–31)
CREAT SERPL-MCNC: 1.04 MG/DL — SIGNIFICANT CHANGE UP (ref 0.5–1.3)
EOSINOPHIL # BLD AUTO: 0.05 K/UL — SIGNIFICANT CHANGE UP (ref 0–0.5)
EOSINOPHIL NFR BLD AUTO: 1.3 % — SIGNIFICANT CHANGE UP (ref 0–6)
GLUCOSE SERPL-MCNC: 89 MG/DL — SIGNIFICANT CHANGE UP (ref 70–99)
HCT VFR BLD CALC: 32.5 % — LOW (ref 34.5–45)
HGB BLD-MCNC: 10.5 G/DL — LOW (ref 11.5–15.5)
IMM GRANULOCYTES NFR BLD AUTO: 0.3 % — SIGNIFICANT CHANGE UP (ref 0–1.5)
LYMPHOCYTES # BLD AUTO: 0.83 K/UL — LOW (ref 1–3.3)
LYMPHOCYTES # BLD AUTO: 21.6 % — SIGNIFICANT CHANGE UP (ref 13–44)
MAGNESIUM SERPL-MCNC: 2.2 MG/DL — SIGNIFICANT CHANGE UP (ref 1.6–2.6)
MCHC RBC-ENTMCNC: 28.1 PG — SIGNIFICANT CHANGE UP (ref 27–34)
MCHC RBC-ENTMCNC: 32.3 % — SIGNIFICANT CHANGE UP (ref 32–36)
MCV RBC AUTO: 86.9 FL — SIGNIFICANT CHANGE UP (ref 80–100)
MONOCYTES # BLD AUTO: 0.41 K/UL — SIGNIFICANT CHANGE UP (ref 0–0.9)
MONOCYTES NFR BLD AUTO: 10.6 % — SIGNIFICANT CHANGE UP (ref 2–14)
NEUTROPHILS # BLD AUTO: 2.54 K/UL — SIGNIFICANT CHANGE UP (ref 1.8–7.4)
NEUTROPHILS NFR BLD AUTO: 65.9 % — SIGNIFICANT CHANGE UP (ref 43–77)
PHOSPHATE SERPL-MCNC: 3.8 MG/DL — SIGNIFICANT CHANGE UP (ref 2.5–4.5)
PLATELET # BLD AUTO: 71 K/UL — LOW (ref 150–400)
PMV BLD: 12.2 FL — SIGNIFICANT CHANGE UP (ref 7–13)
POTASSIUM SERPL-MCNC: 4.4 MMOL/L — SIGNIFICANT CHANGE UP (ref 3.5–5.3)
POTASSIUM SERPL-SCNC: 4.4 MMOL/L — SIGNIFICANT CHANGE UP (ref 3.5–5.3)
RBC # BLD: 3.74 M/UL — LOW (ref 3.8–5.2)
RBC # FLD: 13.8 % — SIGNIFICANT CHANGE UP (ref 10.3–14.5)
SODIUM SERPL-SCNC: 147 MMOL/L — HIGH (ref 135–145)
WBC # BLD: 3.85 K/UL — SIGNIFICANT CHANGE UP (ref 3.8–10.5)
WBC # FLD AUTO: 3.85 K/UL — SIGNIFICANT CHANGE UP (ref 3.8–10.5)

## 2017-03-17 PROCEDURE — 93306 TTE W/DOPPLER COMPLETE: CPT | Mod: 26

## 2017-03-17 PROCEDURE — 99233 SBSQ HOSP IP/OBS HIGH 50: CPT | Mod: GC

## 2017-03-17 RX ORDER — DEXTROSE 50 % IN WATER 50 %
12.5 SYRINGE (ML) INTRAVENOUS ONCE
Qty: 0 | Refills: 0 | Status: DISCONTINUED | OUTPATIENT
Start: 2017-03-17 | End: 2017-03-22

## 2017-03-17 RX ORDER — DEXTROSE 50 % IN WATER 50 %
1 SYRINGE (ML) INTRAVENOUS ONCE
Qty: 0 | Refills: 0 | Status: DISCONTINUED | OUTPATIENT
Start: 2017-03-17 | End: 2017-03-22

## 2017-03-17 RX ORDER — ASPIRIN/CALCIUM CARB/MAGNESIUM 324 MG
81 TABLET ORAL DAILY
Qty: 0 | Refills: 0 | Status: DISCONTINUED | OUTPATIENT
Start: 2017-03-17 | End: 2017-03-22

## 2017-03-17 RX ORDER — ATORVASTATIN CALCIUM 80 MG/1
40 TABLET, FILM COATED ORAL AT BEDTIME
Qty: 0 | Refills: 0 | Status: DISCONTINUED | OUTPATIENT
Start: 2017-03-17 | End: 2017-03-22

## 2017-03-17 RX ORDER — GLUCAGON INJECTION, SOLUTION 0.5 MG/.1ML
1 INJECTION, SOLUTION SUBCUTANEOUS ONCE
Qty: 0 | Refills: 0 | Status: DISCONTINUED | OUTPATIENT
Start: 2017-03-17 | End: 2017-03-22

## 2017-03-17 RX ORDER — DEXTROSE 50 % IN WATER 50 %
25 SYRINGE (ML) INTRAVENOUS ONCE
Qty: 0 | Refills: 0 | Status: DISCONTINUED | OUTPATIENT
Start: 2017-03-17 | End: 2017-03-22

## 2017-03-17 RX ORDER — SODIUM CHLORIDE 9 MG/ML
1000 INJECTION, SOLUTION INTRAVENOUS
Qty: 0 | Refills: 0 | Status: DISCONTINUED | OUTPATIENT
Start: 2017-03-17 | End: 2017-03-22

## 2017-03-17 RX ADMIN — Medication 250 MILLIGRAM(S): at 15:54

## 2017-03-17 RX ADMIN — ATORVASTATIN CALCIUM 40 MILLIGRAM(S): 80 TABLET, FILM COATED ORAL at 22:49

## 2017-03-17 RX ADMIN — Medication 81 MILLIGRAM(S): at 11:16

## 2017-03-17 RX ADMIN — PIPERACILLIN AND TAZOBACTAM 25 GRAM(S): 4; .5 INJECTION, POWDER, LYOPHILIZED, FOR SOLUTION INTRAVENOUS at 05:54

## 2017-03-17 RX ADMIN — PIPERACILLIN AND TAZOBACTAM 25 GRAM(S): 4; .5 INJECTION, POWDER, LYOPHILIZED, FOR SOLUTION INTRAVENOUS at 15:53

## 2017-03-17 RX ADMIN — PIPERACILLIN AND TAZOBACTAM 25 GRAM(S): 4; .5 INJECTION, POWDER, LYOPHILIZED, FOR SOLUTION INTRAVENOUS at 22:51

## 2017-03-17 RX ADMIN — HEPARIN SODIUM 5000 UNIT(S): 5000 INJECTION INTRAVENOUS; SUBCUTANEOUS at 05:54

## 2017-03-18 LAB
ALBUMIN SERPL ELPH-MCNC: 2.7 G/DL — LOW (ref 3.3–5)
ALP SERPL-CCNC: 80 U/L — SIGNIFICANT CHANGE UP (ref 40–120)
ALT FLD-CCNC: 26 U/L — SIGNIFICANT CHANGE UP (ref 4–33)
APTT BLD: 35 SEC — SIGNIFICANT CHANGE UP (ref 27.5–37.4)
AST SERPL-CCNC: 27 U/L — SIGNIFICANT CHANGE UP (ref 4–32)
BASOPHILS # BLD AUTO: 0.01 K/UL — SIGNIFICANT CHANGE UP (ref 0–0.2)
BASOPHILS NFR BLD AUTO: 0.4 % — SIGNIFICANT CHANGE UP (ref 0–2)
BASOPHILS NFR SPEC: 0.9 % — SIGNIFICANT CHANGE UP (ref 0–2)
BILIRUB SERPL-MCNC: 0.6 MG/DL — SIGNIFICANT CHANGE UP (ref 0.2–1.2)
BLASTS # FLD: 0 % — SIGNIFICANT CHANGE UP (ref 0–0)
BUN SERPL-MCNC: 22 MG/DL — SIGNIFICANT CHANGE UP (ref 7–23)
CALCIUM SERPL-MCNC: 9.4 MG/DL — SIGNIFICANT CHANGE UP (ref 8.4–10.5)
CHLORIDE SERPL-SCNC: 107 MMOL/L — SIGNIFICANT CHANGE UP (ref 98–107)
CHOLEST SERPL-MCNC: 203 MG/DL — HIGH (ref 120–199)
CO2 SERPL-SCNC: 28 MMOL/L — SIGNIFICANT CHANGE UP (ref 22–31)
CREAT SERPL-MCNC: 0.94 MG/DL — SIGNIFICANT CHANGE UP (ref 0.5–1.3)
EOSINOPHIL # BLD AUTO: 0.09 K/UL — SIGNIFICANT CHANGE UP (ref 0–0.5)
EOSINOPHIL NFR BLD AUTO: 3.2 % — SIGNIFICANT CHANGE UP (ref 0–6)
EOSINOPHIL NFR FLD: 0.9 % — SIGNIFICANT CHANGE UP (ref 0–6)
GIANT PLATELETS BLD QL SMEAR: PRESENT — SIGNIFICANT CHANGE UP
GLUCOSE SERPL-MCNC: 112 MG/DL — HIGH (ref 70–99)
HAPTOGLOB SERPL-MCNC: 104 MG/DL — SIGNIFICANT CHANGE UP (ref 34–200)
HBA1C BLD-MCNC: 6.2 % — HIGH (ref 4–5.6)
HCT VFR BLD CALC: 31.7 % — LOW (ref 34.5–45)
HDLC SERPL-MCNC: 77 MG/DL — HIGH (ref 45–65)
HGB BLD-MCNC: 10.2 G/DL — LOW (ref 11.5–15.5)
IMM GRANULOCYTES NFR BLD AUTO: 0.7 % — SIGNIFICANT CHANGE UP (ref 0–1.5)
INR BLD: 0.9 — SIGNIFICANT CHANGE UP (ref 0.88–1.17)
LDH SERPL L TO P-CCNC: 187 U/L — SIGNIFICANT CHANGE UP (ref 135–225)
LIPID PNL WITH DIRECT LDL SERPL: 110 MG/DL — SIGNIFICANT CHANGE UP
LYMPHOCYTES # BLD AUTO: 0.98 K/UL — LOW (ref 1–3.3)
LYMPHOCYTES # BLD AUTO: 35.3 % — SIGNIFICANT CHANGE UP (ref 13–44)
LYMPHOCYTES NFR SPEC AUTO: 21.6 % — SIGNIFICANT CHANGE UP (ref 13–44)
MAGNESIUM SERPL-MCNC: 2.3 MG/DL — SIGNIFICANT CHANGE UP (ref 1.6–2.6)
MCHC RBC-ENTMCNC: 28.1 PG — SIGNIFICANT CHANGE UP (ref 27–34)
MCHC RBC-ENTMCNC: 32.2 % — SIGNIFICANT CHANGE UP (ref 32–36)
MCV RBC AUTO: 87.3 FL — SIGNIFICANT CHANGE UP (ref 80–100)
METAMYELOCYTES # FLD: 0 % — SIGNIFICANT CHANGE UP (ref 0–1)
MONOCYTES # BLD AUTO: 0.36 K/UL — SIGNIFICANT CHANGE UP (ref 0–0.9)
MONOCYTES NFR BLD AUTO: 12.9 % — SIGNIFICANT CHANGE UP (ref 2–14)
MONOCYTES NFR BLD: 15.3 % — HIGH (ref 2–9)
MORPHOLOGY BLD-IMP: NORMAL — SIGNIFICANT CHANGE UP
MYELOCYTES NFR BLD: 0 % — SIGNIFICANT CHANGE UP (ref 0–0)
NEUTROPHIL AB SER-ACNC: 55.9 % — SIGNIFICANT CHANGE UP (ref 43–77)
NEUTROPHILS # BLD AUTO: 1.32 K/UL — LOW (ref 1.8–7.4)
NEUTROPHILS NFR BLD AUTO: 47.5 % — SIGNIFICANT CHANGE UP (ref 43–77)
NEUTS BAND # BLD: 0 % — SIGNIFICANT CHANGE UP (ref 0–6)
OTHER - HEMATOLOGY %: 0 — SIGNIFICANT CHANGE UP
PHOSPHATE SERPL-MCNC: 4.6 MG/DL — HIGH (ref 2.5–4.5)
PLATELET # BLD AUTO: 59 K/UL — LOW (ref 150–400)
PLATELET COUNT - ESTIMATE: SIGNIFICANT CHANGE UP
PMV BLD: 11.3 FL — SIGNIFICANT CHANGE UP (ref 7–13)
POTASSIUM SERPL-MCNC: 4.3 MMOL/L — SIGNIFICANT CHANGE UP (ref 3.5–5.3)
POTASSIUM SERPL-SCNC: 4.3 MMOL/L — SIGNIFICANT CHANGE UP (ref 3.5–5.3)
PROMYELOCYTES # FLD: 0 % — SIGNIFICANT CHANGE UP (ref 0–0)
PROT SERPL-MCNC: 5.4 G/DL — LOW (ref 6–8.3)
PROTHROM AB SERPL-ACNC: 10.1 SEC — SIGNIFICANT CHANGE UP (ref 9.8–13.1)
RBC # BLD: 3.63 M/UL — LOW (ref 3.8–5.2)
RBC # FLD: 13.8 % — SIGNIFICANT CHANGE UP (ref 10.3–14.5)
RETICS #: 18.3 10X3/UL — SIGNIFICANT CHANGE UP (ref 17–73)
RETICS/RBC NFR: 0.5 % — SIGNIFICANT CHANGE UP (ref 0.5–2.5)
SODIUM SERPL-SCNC: 147 MMOL/L — HIGH (ref 135–145)
TRIGL SERPL-MCNC: 81 MG/DL — SIGNIFICANT CHANGE UP (ref 10–149)
VANCOMYCIN TROUGH SERPL-MCNC: 18.1 UG/ML — SIGNIFICANT CHANGE UP (ref 10–20)
VARIANT LYMPHS # BLD: 5.4 % — SIGNIFICANT CHANGE UP
WBC # BLD: 2.78 K/UL — LOW (ref 3.8–10.5)
WBC # FLD AUTO: 2.78 K/UL — LOW (ref 3.8–10.5)

## 2017-03-18 PROCEDURE — 99233 SBSQ HOSP IP/OBS HIGH 50: CPT | Mod: GC

## 2017-03-18 RX ORDER — SODIUM CHLORIDE 9 MG/ML
1000 INJECTION, SOLUTION INTRAVENOUS
Qty: 0 | Refills: 0 | Status: DISCONTINUED | OUTPATIENT
Start: 2017-03-18 | End: 2017-03-18

## 2017-03-18 RX ADMIN — SODIUM CHLORIDE 70 MILLILITER(S): 9 INJECTION, SOLUTION INTRAVENOUS at 10:05

## 2017-03-18 RX ADMIN — Medication 81 MILLIGRAM(S): at 12:28

## 2017-03-18 RX ADMIN — SODIUM CHLORIDE 70 MILLILITER(S): 9 INJECTION, SOLUTION INTRAVENOUS at 12:28

## 2017-03-18 RX ADMIN — PIPERACILLIN AND TAZOBACTAM 25 GRAM(S): 4; .5 INJECTION, POWDER, LYOPHILIZED, FOR SOLUTION INTRAVENOUS at 05:02

## 2017-03-18 RX ADMIN — ATORVASTATIN CALCIUM 40 MILLIGRAM(S): 80 TABLET, FILM COATED ORAL at 22:06

## 2017-03-18 NOTE — PHYSICAL THERAPY INITIAL EVALUATION ADULT - CRITERIA FOR SKILLED THERAPEUTIC INTERVENTIONS
functional limitations in following categories/risk reduction/prevention/impairments found/rehab potential

## 2017-03-18 NOTE — PHYSICAL THERAPY INITIAL EVALUATION ADULT - ACTIVE RANGE OF MOTION EXAMINATION, REHAB EVAL
Left shoulder flexion ~30 degrees, Bilateral Knee flexion ~60 degrees/Right UE Active ROM was WFL (within functional limits)

## 2017-03-18 NOTE — PHYSICAL THERAPY INITIAL EVALUATION ADULT - MANUAL MUSCLE TESTING RESULTS, REHAB EVAL
grossly assessed due to/cognitive impairment; Right UE 3/5, Left shoulder 2-/5, Left elbow/wrist/hand 3/5, Bilateral LE 3/5

## 2017-03-18 NOTE — PHYSICAL THERAPY INITIAL EVALUATION ADULT - ADDITIONAL COMMENTS
Pt is a poor historian. According to pt, pt lives in a private house with sone with a few steps to negotiate to enter the house; bedroom is on first floor. Prior to hospital admission pt reports that she was completely independent and ambulated using a single axis cane. (will have to confirm with pt's family if this information is correct) Pt is a poor historian. According to pt, pt lives in a private house with son with a few steps to negotiate to enter the house; bedroom is on first floor. Prior to hospital admission pt reports that she was completely independent and ambulated using a single axis cane. (will have to confirm with pt's family if this information is correct)

## 2017-03-18 NOTE — PHYSICAL THERAPY INITIAL EVALUATION ADULT - PASSIVE RANGE OF MOTION EXAMINATION, REHAB EVAL
Left Shoulder flexion ~50 degrees secondary to pain (muscle guarding noted), Left elbow /wrist WFL/bilateral lower extremity Passive ROM was WFL (within functional limits)/Right UE Passive ROM was WFL (within functional limits)

## 2017-03-18 NOTE — PHYSICAL THERAPY INITIAL EVALUATION ADULT - PATIENT PROFILE REVIEW, REHAB EVAL
ACTIVITY: Increase as Tolerated; spoke with RN Adia Carr prior to PT evaluation--> Pt OK for bed mobility/yes

## 2017-03-18 NOTE — PHYSICAL THERAPY INITIAL EVALUATION ADULT - PERTINENT HX OF CURRENT PROBLEM, REHAB EVAL
86 F pmhx of headaches and constipation presenting with metabolic encephalopathy, hypothermia, bradycardia likely secondary to infectious process. UA neg, CXR neg for PNA

## 2017-03-18 NOTE — PHYSICAL THERAPY INITIAL EVALUATION ADULT - PLANNED THERAPY INTERVENTIONS, PT EVAL
ROM/strengthening/neuromuscular re-education/gait training/bed mobility training/transfer training/motor coordination training/balance training

## 2017-03-19 ENCOUNTER — TRANSCRIPTION ENCOUNTER (OUTPATIENT)
Age: 82
End: 2017-03-19

## 2017-03-19 LAB
ALBUMIN SERPL ELPH-MCNC: 2.9 G/DL — LOW (ref 3.3–5)
ALP SERPL-CCNC: 96 U/L — SIGNIFICANT CHANGE UP (ref 40–120)
ALT FLD-CCNC: 30 U/L — SIGNIFICANT CHANGE UP (ref 4–33)
APTT BLD: 35.3 SEC — SIGNIFICANT CHANGE UP (ref 27.5–37.4)
AST SERPL-CCNC: 31 U/L — SIGNIFICANT CHANGE UP (ref 4–32)
BACTERIA BLD CULT: SIGNIFICANT CHANGE UP
BACTERIA BLD CULT: SIGNIFICANT CHANGE UP
BASOPHILS # BLD AUTO: 0 K/UL — SIGNIFICANT CHANGE UP (ref 0–0.2)
BASOPHILS NFR BLD AUTO: 0 % — SIGNIFICANT CHANGE UP (ref 0–2)
BILIRUB SERPL-MCNC: 0.5 MG/DL — SIGNIFICANT CHANGE UP (ref 0.2–1.2)
BLD GP AB SCN SERPL QL: NEGATIVE — SIGNIFICANT CHANGE UP
BUN SERPL-MCNC: 22 MG/DL — SIGNIFICANT CHANGE UP (ref 7–23)
BUN SERPL-MCNC: 22 MG/DL — SIGNIFICANT CHANGE UP (ref 7–23)
CALCIUM SERPL-MCNC: 9.6 MG/DL — SIGNIFICANT CHANGE UP (ref 8.4–10.5)
CALCIUM SERPL-MCNC: 9.9 MG/DL — SIGNIFICANT CHANGE UP (ref 8.4–10.5)
CHLORIDE SERPL-SCNC: 105 MMOL/L — SIGNIFICANT CHANGE UP (ref 98–107)
CHLORIDE SERPL-SCNC: 105 MMOL/L — SIGNIFICANT CHANGE UP (ref 98–107)
CO2 SERPL-SCNC: 25 MMOL/L — SIGNIFICANT CHANGE UP (ref 22–31)
CO2 SERPL-SCNC: 29 MMOL/L — SIGNIFICANT CHANGE UP (ref 22–31)
CREAT SERPL-MCNC: 0.85 MG/DL — SIGNIFICANT CHANGE UP (ref 0.5–1.3)
CREAT SERPL-MCNC: 0.92 MG/DL — SIGNIFICANT CHANGE UP (ref 0.5–1.3)
EOSINOPHIL # BLD AUTO: 0.08 K/UL — SIGNIFICANT CHANGE UP (ref 0–0.5)
EOSINOPHIL NFR BLD AUTO: 2.2 % — SIGNIFICANT CHANGE UP (ref 0–6)
GLUCOSE SERPL-MCNC: 102 MG/DL — HIGH (ref 70–99)
GLUCOSE SERPL-MCNC: 111 MG/DL — HIGH (ref 70–99)
HAPTOGLOB SERPL-MCNC: 105 MG/DL — SIGNIFICANT CHANGE UP (ref 34–200)
HCT VFR BLD CALC: 34.1 % — LOW (ref 34.5–45)
HGB BLD-MCNC: 10.9 G/DL — LOW (ref 11.5–15.5)
IMM GRANULOCYTES NFR BLD AUTO: 0 % — SIGNIFICANT CHANGE UP (ref 0–1.5)
INR BLD: 0.89 — SIGNIFICANT CHANGE UP (ref 0.88–1.17)
LDH SERPL L TO P-CCNC: 236 U/L — HIGH (ref 135–225)
LYMPHOCYTES # BLD AUTO: 0.82 K/UL — LOW (ref 1–3.3)
LYMPHOCYTES # BLD AUTO: 22.4 % — SIGNIFICANT CHANGE UP (ref 13–44)
MAGNESIUM SERPL-MCNC: 2.4 MG/DL — SIGNIFICANT CHANGE UP (ref 1.6–2.6)
MAGNESIUM SERPL-MCNC: 2.4 MG/DL — SIGNIFICANT CHANGE UP (ref 1.6–2.6)
MCHC RBC-ENTMCNC: 27.9 PG — SIGNIFICANT CHANGE UP (ref 27–34)
MCHC RBC-ENTMCNC: 32 % — SIGNIFICANT CHANGE UP (ref 32–36)
MCV RBC AUTO: 87.2 FL — SIGNIFICANT CHANGE UP (ref 80–100)
MONOCYTES # BLD AUTO: 0.45 K/UL — SIGNIFICANT CHANGE UP (ref 0–0.9)
MONOCYTES NFR BLD AUTO: 12.3 % — SIGNIFICANT CHANGE UP (ref 2–14)
NEUTROPHILS # BLD AUTO: 2.31 K/UL — SIGNIFICANT CHANGE UP (ref 1.8–7.4)
NEUTROPHILS NFR BLD AUTO: 63.1 % — SIGNIFICANT CHANGE UP (ref 43–77)
PHOSPHATE SERPL-MCNC: 4.1 MG/DL — SIGNIFICANT CHANGE UP (ref 2.5–4.5)
PHOSPHATE SERPL-MCNC: 4.4 MG/DL — SIGNIFICANT CHANGE UP (ref 2.5–4.5)
PLATELET # BLD AUTO: 61 K/UL — LOW (ref 150–400)
PMV BLD: 11.3 FL — SIGNIFICANT CHANGE UP (ref 7–13)
POTASSIUM SERPL-MCNC: 5.4 MMOL/L — HIGH (ref 3.5–5.3)
POTASSIUM SERPL-MCNC: 5.5 MMOL/L — HIGH (ref 3.5–5.3)
POTASSIUM SERPL-SCNC: 5.4 MMOL/L — HIGH (ref 3.5–5.3)
POTASSIUM SERPL-SCNC: 5.5 MMOL/L — HIGH (ref 3.5–5.3)
PROT SERPL-MCNC: 6 G/DL — SIGNIFICANT CHANGE UP (ref 6–8.3)
PROTHROM AB SERPL-ACNC: 10 SEC — SIGNIFICANT CHANGE UP (ref 9.8–13.1)
RBC # BLD: 3.91 M/UL — SIGNIFICANT CHANGE UP (ref 3.8–5.2)
RBC # FLD: 13.9 % — SIGNIFICANT CHANGE UP (ref 10.3–14.5)
RETICS #: 27 10X3/UL — SIGNIFICANT CHANGE UP (ref 17–73)
RETICS/RBC NFR: 0.7 % — SIGNIFICANT CHANGE UP (ref 0.5–2.5)
RH IG SCN BLD-IMP: POSITIVE — SIGNIFICANT CHANGE UP
SODIUM SERPL-SCNC: 145 MMOL/L — SIGNIFICANT CHANGE UP (ref 135–145)
SODIUM SERPL-SCNC: 145 MMOL/L — SIGNIFICANT CHANGE UP (ref 135–145)
WBC # BLD: 3.66 K/UL — LOW (ref 3.8–10.5)
WBC # FLD AUTO: 3.66 K/UL — LOW (ref 3.8–10.5)

## 2017-03-19 PROCEDURE — 99233 SBSQ HOSP IP/OBS HIGH 50: CPT | Mod: GC

## 2017-03-19 RX ORDER — SODIUM POLYSTYRENE SULFONATE 4.1 MEQ/G
15 POWDER, FOR SUSPENSION ORAL ONCE
Qty: 0 | Refills: 0 | Status: COMPLETED | OUTPATIENT
Start: 2017-03-19 | End: 2017-03-19

## 2017-03-19 RX ORDER — SODIUM POLYSTYRENE SULFONATE 4.1 MEQ/G
30 POWDER, FOR SUSPENSION ORAL ONCE
Qty: 0 | Refills: 0 | Status: COMPLETED | OUTPATIENT
Start: 2017-03-19 | End: 2017-03-19

## 2017-03-19 RX ADMIN — SODIUM POLYSTYRENE SULFONATE 15 GRAM(S): 4.1 POWDER, FOR SUSPENSION ORAL at 18:32

## 2017-03-19 RX ADMIN — Medication 81 MILLIGRAM(S): at 13:34

## 2017-03-19 RX ADMIN — SODIUM POLYSTYRENE SULFONATE 30 GRAM(S): 4.1 POWDER, FOR SUSPENSION ORAL at 21:58

## 2017-03-19 RX ADMIN — ATORVASTATIN CALCIUM 40 MILLIGRAM(S): 80 TABLET, FILM COATED ORAL at 21:58

## 2017-03-19 NOTE — DISCHARGE NOTE ADULT - CARE PROVIDER_API CALL
Libman, Richard B (MD), Neurology; Vascular Neurology  [address] [place] [id number]  Phone: [phone]  Fax: [phone]

## 2017-03-19 NOTE — DISCHARGE NOTE ADULT - NS AS DC FOLLOWUP STROKE INST
Stroke (includes: TIA/SAH/ICH/Ischemic Stroke) Influenza vaccination (VIS Pub Date: August 19, 2014)/Stroke (includes: TIA/SAH/ICH/Ischemic Stroke)/Smoking Cessation

## 2017-03-19 NOTE — DISCHARGE NOTE ADULT - PATIENT PORTAL LINK FT
“You can access the [facility] Patient Portal, offered by [facility], by registering with the following website: [email or web address]”

## 2017-03-19 NOTE — DISCHARGE NOTE ADULT - PLAN OF CARE
Prevent stroke recurrence You were diagnosed with a stroke during this hospitalization. You will be transferred to rehabilitation for further care. You were diagnosed with a stroke during this hospitalization. You will be transferred to rehabilitation for further care. Please follow up with your neurologist (Dr. Libman) within 2 weeks of discharge from the hospital. Please take your medications as prescribed. You were noted to have low blood sugars during this admission. Please have your blood sugar checked if you are not eating, or are confused. If your blood sugar is below 85, please drink apple juice and recheck your blood sugar to achieve goal of blood sugar over 120. Normal blood sugar

## 2017-03-19 NOTE — DISCHARGE NOTE ADULT - MEDICATION SUMMARY - MEDICATIONS TO TAKE
I will START or STAY ON the medications listed below when I get home from the hospital:    acetaminophen 325 mg oral tablet  -- 2 tab(s) by mouth every 6 hours, As needed, moderate to severe pain (4-10)  -- Indication: For Pain    aspirin 81 mg oral tablet, chewable  -- 1 tab(s) by mouth once a day  -- Indication: For Pain    nystatin 100,000 units/mL oral suspension  -- 5 milliliter(s) by mouth every 6 hours. (last day 04/03/17)  -- Indication: For Thrush    atorvastatin 40 mg oral tablet  -- 1 tab(s) by mouth once a day (at bedtime)  -- Indication: For CVA (cerebrovascular accident) I will START or STAY ON the medications listed below when I get home from the hospital:    Glucometer  -- Use to check blood sugar if patient is not eating.  -- Indication: For Hypoglycemia    Glucometer  -- Use to check blood sugar when patient is not eating.   -- Indication: For Hypoglycemia    Glucose Test Strips  -- Use to check blood sugar when patient is not eating.   -- Indication: For Hypoglycemia    Lancets  -- Use to check blood sugar when patient is not eating.   -- Indication: For Hypoglycemia    acetaminophen 325 mg oral tablet  -- 2 tab(s) by mouth every 6 hours, As needed, moderate to severe pain (4-10)  -- Indication: For Pain    aspirin 81 mg oral tablet, chewable  -- 1 tab(s) by mouth once a day  -- Indication: For Pain    nystatin 100,000 units/mL oral suspension  -- 5 milliliter(s) by mouth every 6 hours. (last day 04/03/17)  -- Indication: For Thrush    atorvastatin 40 mg oral tablet  -- 1 tab(s) by mouth once a day (at bedtime)  -- Indication: For CVA (cerebrovascular accident)

## 2017-03-19 NOTE — DISCHARGE NOTE ADULT - HOSPITAL COURSE
86 F pmhx of headaches and constipation presenting with AMS. History was obtained from son as bedside. Patient was in her usual state of health, until a few days ago when family noticed patient had a slurred speech. The speech impairment resolved on its own, so family did not bring the patient to the hospital. Patient returned to her baseline. At baseline, patient is quite active and ambulatory. This morning, she woke up with confusion. As per son, patient was acting erratic. Speech was mumbled. Patient was making statements such as "I want to be cremated." 911 was called and patient was brought to the ED.     In the ED, T 89.9, /105 HR 59 RR 16 SpO2 100 RA. In the ED, patient was given vancomycin and zosyn and 1.5 L bolus. Due to hypothermia, patient was admitted to the MICU.     Pt continued on broad spectrum antibiotics, hyperthermia blanket initiated for concern of sepsis. A CT brain showed no acute intracranial pathology. She had electrolyte derangement of hypernatremia and hyperkalemia with appropriate correction. Chest x-ray was negative for any acute lung consolidation, or evidence of pneumonia. Urinalysis negative, RVP, urine and blood cultures were negative. 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 Per neurology, pt started on ASA, Lipitor. 86 F pmhx of headaches and constipation presenting with AMS. History was obtained from son as bedside. Patient was in her usual state of health, until a few days ago when family noticed patient had a slurred speech. The speech impairment resolved on its own, so family did not bring the patient to the hospital. Patient returned to her baseline. At baseline, patient is quite active and ambulatory. This morning, she woke up with confusion. As per son, patient was acting erratic. Speech was mumbled. Patient was making statements such as "I want to be cremated." 911 was called and patient was brought to the ED.     In the ED, T 89.9, /105 HR 59 RR 16 SpO2 100 RA. In the ED, patient was given vancomycin and zosyn and 1.5 L bolus. Due to hypothermia, patient was admitted to the MICU.     Pt continued on broad spectrum antibiotics, hyperthermia blanket initiated for concern of sepsis. A CT brain showed no acute intracranial pathology. She had electrolyte derangement of hypernatremia and hyperkalemia with appropriate correction. Chest x-ray was negative for any acute lung consolidation, or evidence of pneumonia. Urinalysis negative, RVP, urine and blood cultures were negative. 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 Per neurology, pt started on ASA, and Lipitor.  TTE shows normal LV systolic function and valvular vegetations.. 86 F pmhx of headaches and constipation presenting with AMS. History was obtained from son as bedside. Patient was in her usual state of health, until a few days ago when family noticed patient had a slurred speech. The speech impairment resolved on its own, so family did not bring the patient to the hospital. Patient returned to her baseline. At baseline, patient is quite active and ambulatory. This morning, she woke up with confusion. As per son, patient was acting erratic. Speech was mumbled. Patient was making statements such as "I want to be cremated." 911 was called and patient was brought to the ED.     In the ED, T 89.9, /105 HR 59 RR 16 SpO2 100 RA. In the ED, patient was given vancomycin and zosyn and 1.5 L bolus. Due to hypothermia, patient was admitted to the MICU.     Pt continued on broad spectrum antibiotics, hyperthermia blanket initiated for concern of sepsis. A CT brain showed no acute intracranial pathology. She had electrolyte derangement of hypernatremia and hyperkalemia with appropriate correction. Chest x-ray was negative for any acute lung consolidation, or evidence of pneumonia. Urinalysis negative, RVP, urine and blood cultures were negative. 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 Per neurology, pt started on ASA, and Lipitor.  TTE shows normal LV systolic function and valvular vegetations. Mental status improved over the next subsequent days and the pt returned to baseline per family at bedside. On the day of discharge, the pt was medically cleared with stable vitals. She was discharged to subacute rehab. 86 F pmhx of headaches and constipation presenting with AMS. History was obtained from son as bedside. Patient was in her usual state of health, until a few days ago when family noticed patient had a slurred speech. The speech impairment resolved on its own, so family did not bring the patient to the hospital. Patient returned to her baseline. At baseline, patient is quite active and ambulatory. This morning, she woke up with confusion. As per son, patient was acting erratic. Speech was mumbled. Patient was making statements such as "I want to be cremated." 911 was called and patient was brought to the ED.     In the ED, T 89.9, /105 HR 59 RR 16 SpO2 100 RA. In the ED, patient was given vancomycin and zosyn and 1.5 L bolus. Due to hypothermia, patient was admitted to the MICU.     Pt continued on broad spectrum antibiotics, hyperthermia blanket initiated for concern of sepsis. A CT brain showed no acute intracranial pathology. She had electrolyte derangement of hypernatremia and hyperkalemia with appropriate correction. Chest x-ray was negative for any acute lung consolidation, or evidence of pneumonia. Urinalysis negative, RVP, urine and blood cultures were negative. 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 Per neurology, pt started on ASA, and Lipitor.  TTE shows normal LV systolic function and valvular vegetations. Mental status improved over the next subsequent days and the pt returned to baseline per family at bedside. On the day of discharge, the pt was medically cleared with stable vitals. She was discharged to subacute rehab. She will need carotid doppler studies performed outpatient to rule out carotid stenosis. 86 F pmhx of headaches and constipation presenting with AMS. History was obtained from son as bedside. Patient was in her usual state of health, until a few days ago when family noticed patient had a slurred speech. The speech impairment resolved on its own, so family did not bring the patient to the hospital. Patient returned to her baseline. At baseline, patient is quite active and ambulatory. This morning, she woke up with confusion. As per son, patient was acting erratic. Speech was mumbled. Patient was making statements such as "I want to be cremated." 911 was called and patient was brought to the ED.     In the ED, T 89.9, /105 HR 59 RR 16 SpO2 100 RA. In the ED, patient was given vancomycin and zosyn and 1.5 L bolus. Due to hypothermia, patient was admitted to the MICU.     Pt continued on broad spectrum antibiotics, hyperthermia blanket initiated for concern of sepsis. A CT brain showed no acute intracranial pathology. She had electrolyte derangement of hypernatremia and hyperkalemia with appropriate correction. Chest x-ray was negative for any acute lung consolidation, or evidence of pneumonia. Urinalysis negative, RVP, urine and blood cultures were negative. 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 Per neurology, pt started on ASA, and Lipitor.  TTE shows normal LV systolic function and valvular vegetations. Mental status improved over the next subsequent days and the pt returned to baseline per family at bedside. On the day of discharge, the pt was medically cleared with stable vitals. She was discharged to subacute rehab. She will need carotid doppler studies performed outpatient to rule out carotid stenosis. Glucometer, test strips, and lancets were sent to the [facility] in [place] ([address] [place], [place]) so that the patient can test her blood glucose when she is not eating. 86 F pmhx of headaches and constipation presenting with AMS. History was obtained from son as bedside. Patient was in her usual state of health, until a few days ago when family noticed patient had a slurred speech. The speech impairment resolved on its own, so family did not bring the patient to the hospital. Patient returned to her baseline. At baseline, patient is quite active and ambulatory. This morning, she woke up with confusion. As per son, patient was acting erratic. Speech was mumbled. Patient was making statements such as "I want to be cremated." 911 was called and patient was brought to the ED.     In the ED, T 89.9, /105 HR 59 RR 16 SpO2 100 RA. In the ED, patient was given vancomycin and zosyn and 1.5 L bolus. Due to hypothermia, patient was admitted to the MICU.     Pt continued on broad spectrum antibiotics, hyperthermia blanket initiated for concern of sepsis. A CT brain showed no acute intracranial pathology. She had electrolyte derangement of hypernatremia and hyperkalemia with appropriate correction. Chest x-ray was negative for any acute lung consolidation, or evidence of pneumonia. Urinalysis negative, RVP, urine and blood cultures were negative. 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 Per neurology, pt started on ASA, and Lipitor.  TTE shows normal LV systolic function. Mental status improved over the next subsequent days and the pt returned to baseline per family at bedside. On the day of discharge, the pt was medically cleared with stable vitals. She was discharged to subacute rehab. She will need carotid doppler studies performed outpatient to rule out carotid stenosis. Glucometer, test strips, and lancets were sent to [facility] so that the patient can test her blood glucose when she is not eating. 86 F pmhx of headaches and constipation presenting with AMS. History was obtained from son as bedside. Patient was in her usual state of health, until a few days ago when family noticed patient had a slurred speech. The speech impairment resolved on its own, so family did not bring the patient to the hospital. Patient returned to her baseline. At baseline, patient is quite active and ambulatory. This morning, she woke up with confusion. As per son, patient was acting erratic. Speech was mumbled. Patient was making statements such as "I want to be cremated." 911 was called and patient was brought to the ED.     In the ED, T 89.9, /105 HR 59 RR 16 SpO2 100 RA. In the ED, patient was given vancomycin and zosyn and 1.5 L bolus. Due to hypothermia, patient was admitted to the MICU.     Pt continued on broad spectrum antibiotics, hyperthermia blanket initiated for concern of sepsis. A CT brain showed no acute intracranial pathology. She had electrolyte derangement of hypernatremia and hyperkalemia with appropriate correction. Chest x-ray was negative for any acute lung consolidation, or evidence of pneumonia. Urinalysis negative, RVP, urine and blood cultures were negative. An MRI was obtained and showed acute vs subacute R basal ganglia CVA. Per neurology, pt started on ASA, and Lipitor.  TTE shows normal LV systolic function. Mental status improved over the next subsequent days and the pt returned to baseline per family at bedside. Given location of CVA and lack of expected corollary clinical symptoms, neuro expressed skepticism that CVA was underlying etiology of presentation. Overall, pt had resp distress, marrow suppression and hypoglycemia, c/f viral syndrome leading to decreased PO and hypoglycemia which may have ultimately been the underlying etiology of her presentation. Pt's family made aware of this concern and she was ordered for glucometer and test strips so they can check fs if pt has decreased PO as OP. On the day of discharge, the pt was medically cleared with stable vitals. She was discharged to subacute rehab. She will need carotid doppler studies performed outpatient to rule out carotid stenosis.

## 2017-03-19 NOTE — DIETITIAN INITIAL EVALUATION ADULT. - PROBLEM SELECTOR PLAN 1
likely secondary to infectious process vs. CVA. UA neg, CXR neg for PNA. RVP neg  - Blood and urine cultures pending  - Broad antibiotic coverage: vancomycin and zosyn  - MRI head pending   - Urine legionella pending  - LR fluids

## 2017-03-19 NOTE — DISCHARGE NOTE ADULT - CARE PLAN
Principal Discharge DX:	CVA (cerebrovascular accident)  Secondary Diagnosis:	Metabolic encephalopathy Principal Discharge DX:	CVA (cerebrovascular accident)  Goal:	Prevent stroke recurrence  Instructions for follow-up, activity and diet:	You were diagnosed with a stroke during this hospitalization. You will be transferred to rehabilitation for further care. Principal Discharge DX:	CVA (cerebrovascular accident)  Goal:	Prevent stroke recurrence  Instructions for follow-up, activity and diet:	You were diagnosed with a stroke during this hospitalization. You will be transferred to rehabilitation for further care. Please follow up with your neurologist (Dr. Libman) within 2 weeks of discharge from the hospital. Please take your medications as prescribed. Principal Discharge DX:	CVA (cerebrovascular accident)  Goal:	Prevent stroke recurrence  Instructions for follow-up, activity and diet:	You were diagnosed with a stroke during this hospitalization. You will be transferred to rehabilitation for further care. Please follow up with your neurologist (Dr. Libman) within 2 weeks of discharge from the hospital. Please take your medications as prescribed.  Secondary Diagnosis:	Hypoglycemia  Goal:	Normal blood sugar  Instructions for follow-up, activity and diet:	You were noted to have low blood sugars during this admission. Please have your blood sugar checked if you are not eating, or are confused. If your blood sugar is below 85, please drink apple juice and recheck your blood sugar to achieve goal of blood sugar over 120.

## 2017-03-19 NOTE — DISCHARGE NOTE ADULT - NS AS DC STROKE ED MATERIALS
Need for Followup After Discharge/Risk Factors for Stroke/Prescribed Medications/Stroke Education Booklet/Stroke Warning Signs and Symptoms/Call 911 for Stroke

## 2017-03-19 NOTE — DIETITIAN INITIAL EVALUATION ADULT. - OTHER INFO
Nutrition consult received for TF recs. Pt s/p CVA and NGT place.  She is currently receiving Jevity 1.2 @ 40mL/hr continuously for 24 hours.  Per RN, tolerating well.  No GI distress (nausea/vomiting/diarrhea/constipation.) Pt with plans for swallow evaluation when/if mental status improves. Pt said to be more alert today, but remains confused. Pt asleep at the time of visit.  At present time, enteral nutrition provides 1152 kcal 53 g protein.  Suggest increasing rate.

## 2017-03-20 LAB
BUN SERPL-MCNC: 22 MG/DL — SIGNIFICANT CHANGE UP (ref 7–23)
BUN SERPL-MCNC: 24 MG/DL — HIGH (ref 7–23)
CALCIUM SERPL-MCNC: 9.5 MG/DL — SIGNIFICANT CHANGE UP (ref 8.4–10.5)
CALCIUM SERPL-MCNC: 9.7 MG/DL — SIGNIFICANT CHANGE UP (ref 8.4–10.5)
CHLORIDE SERPL-SCNC: 107 MMOL/L — SIGNIFICANT CHANGE UP (ref 98–107)
CHLORIDE SERPL-SCNC: 108 MMOL/L — HIGH (ref 98–107)
CO2 SERPL-SCNC: 24 MMOL/L — SIGNIFICANT CHANGE UP (ref 22–31)
CO2 SERPL-SCNC: 27 MMOL/L — SIGNIFICANT CHANGE UP (ref 22–31)
CREAT SERPL-MCNC: 0.9 MG/DL — SIGNIFICANT CHANGE UP (ref 0.5–1.3)
CREAT SERPL-MCNC: 0.92 MG/DL — SIGNIFICANT CHANGE UP (ref 0.5–1.3)
GLUCOSE SERPL-MCNC: 134 MG/DL — HIGH (ref 70–99)
GLUCOSE SERPL-MCNC: 63 MG/DL — LOW (ref 70–99)
HCT VFR BLD CALC: 36.7 % — SIGNIFICANT CHANGE UP (ref 34.5–45)
HGB BLD-MCNC: 11.9 G/DL — SIGNIFICANT CHANGE UP (ref 11.5–15.5)
MAGNESIUM SERPL-MCNC: 2.4 MG/DL — SIGNIFICANT CHANGE UP (ref 1.6–2.6)
MCHC RBC-ENTMCNC: 28.3 PG — SIGNIFICANT CHANGE UP (ref 27–34)
MCHC RBC-ENTMCNC: 32.4 % — SIGNIFICANT CHANGE UP (ref 32–36)
MCV RBC AUTO: 87.2 FL — SIGNIFICANT CHANGE UP (ref 80–100)
PHOSPHATE SERPL-MCNC: 3.8 MG/DL — SIGNIFICANT CHANGE UP (ref 2.5–4.5)
PLATELET # BLD AUTO: 82 K/UL — LOW (ref 150–400)
PMV BLD: SIGNIFICANT CHANGE UP FL (ref 7–13)
POTASSIUM SERPL-MCNC: 5.2 MMOL/L — SIGNIFICANT CHANGE UP (ref 3.5–5.3)
POTASSIUM SERPL-MCNC: 5.4 MMOL/L — HIGH (ref 3.5–5.3)
POTASSIUM SERPL-SCNC: 5.2 MMOL/L — SIGNIFICANT CHANGE UP (ref 3.5–5.3)
POTASSIUM SERPL-SCNC: 5.4 MMOL/L — HIGH (ref 3.5–5.3)
RBC # BLD: 4.21 M/UL — SIGNIFICANT CHANGE UP (ref 3.8–5.2)
RBC # FLD: 13.8 % — SIGNIFICANT CHANGE UP (ref 10.3–14.5)
SODIUM SERPL-SCNC: 145 MMOL/L — SIGNIFICANT CHANGE UP (ref 135–145)
SODIUM SERPL-SCNC: 147 MMOL/L — HIGH (ref 135–145)
VANCOMYCIN TROUGH SERPL-MCNC: 6.3 UG/ML — LOW (ref 10–20)
WBC # BLD: 5.5 K/UL — SIGNIFICANT CHANGE UP (ref 3.8–10.5)
WBC # FLD AUTO: 5.5 K/UL — SIGNIFICANT CHANGE UP (ref 3.8–10.5)

## 2017-03-20 PROCEDURE — 99233 SBSQ HOSP IP/OBS HIGH 50: CPT | Mod: GC

## 2017-03-20 PROCEDURE — 99233 SBSQ HOSP IP/OBS HIGH 50: CPT

## 2017-03-20 RX ORDER — NYSTATIN 500MM UNIT
500000 POWDER (EA) MISCELLANEOUS EVERY 6 HOURS
Qty: 0 | Refills: 0 | Status: DISCONTINUED | OUTPATIENT
Start: 2017-03-20 | End: 2017-03-22

## 2017-03-20 RX ORDER — SODIUM CHLORIDE 9 MG/ML
1000 INJECTION, SOLUTION INTRAVENOUS
Qty: 0 | Refills: 0 | Status: DISCONTINUED | OUTPATIENT
Start: 2017-03-20 | End: 2017-03-20

## 2017-03-20 RX ORDER — SODIUM CHLORIDE 9 MG/ML
1000 INJECTION, SOLUTION INTRAVENOUS
Qty: 0 | Refills: 0 | Status: DISCONTINUED | OUTPATIENT
Start: 2017-03-20 | End: 2017-03-21

## 2017-03-20 RX ORDER — HEPARIN SODIUM 5000 [USP'U]/ML
5000 INJECTION INTRAVENOUS; SUBCUTANEOUS EVERY 12 HOURS
Qty: 0 | Refills: 0 | Status: DISCONTINUED | OUTPATIENT
Start: 2017-03-20 | End: 2017-03-22

## 2017-03-20 RX ORDER — DEXTROSE 50 % IN WATER 50 %
25 SYRINGE (ML) INTRAVENOUS ONCE
Qty: 0 | Refills: 0 | Status: COMPLETED | OUTPATIENT
Start: 2017-03-20 | End: 2017-03-20

## 2017-03-20 RX ORDER — DEXTROSE 50 % IN WATER 50 %
50 SYRINGE (ML) INTRAVENOUS ONCE
Qty: 0 | Refills: 0 | Status: COMPLETED | OUTPATIENT
Start: 2017-03-20 | End: 2017-03-20

## 2017-03-20 RX ADMIN — Medication 50 MILLILITER(S): at 11:56

## 2017-03-20 RX ADMIN — HEPARIN SODIUM 5000 UNIT(S): 5000 INJECTION INTRAVENOUS; SUBCUTANEOUS at 17:14

## 2017-03-20 RX ADMIN — SODIUM CHLORIDE 50 MILLILITER(S): 9 INJECTION, SOLUTION INTRAVENOUS at 12:33

## 2017-03-20 RX ADMIN — Medication 25 GRAM(S): at 17:05

## 2017-03-20 NOTE — SWALLOW BEDSIDE ASSESSMENT ADULT - ASR SWALLOW ASPIRATION MONITOR
change of breathing pattern/oral hygiene/upper respiratory infection/fever/pneumonia/position upright (90Y)/gurgly voice/throat clearing/cough

## 2017-03-20 NOTE — OCCUPATIONAL THERAPY INITIAL EVALUATION ADULT - PLANNED THERAPY INTERVENTIONS, OT EVAL
transfer training/strengthening/ADL retraining/bed mobility training/motor coordination training/balance training

## 2017-03-20 NOTE — SWALLOW BEDSIDE ASSESSMENT ADULT - PHARYNGEAL PHASE
Delayed pharyngeal swallow/Decreased laryngeal elevation Decreased laryngeal elevation/Delayed pharyngeal swallow Throat clear post oral intake/Delayed pharyngeal swallow/Decreased laryngeal elevation

## 2017-03-20 NOTE — SWALLOW BEDSIDE ASSESSMENT ADULT - COMMENTS
The patient was seen this afternoon for initial assessment of swallow function, at which time patient was alert and cooperative. Patient's children were present at bedside throughout this evaluation. Patient recently had NGT removed this AM and is now NPO pending speech and swallow evaluation. Per charting, the patient is an 85 y/o F with PMHx significant of headaches and constipation presenting to [facility] with AMS. Patient with dx of "Encephalopathy; R Basal Ganglia CVA; Possible Infectious Process." Discussed results and recommendations from this evaluation with the patient, patient's family and Team 2.

## 2017-03-20 NOTE — OCCUPATIONAL THERAPY INITIAL EVALUATION ADULT - RANGE OF MOTION EXAMINATION, UPPER EXTREMITY
L sh 0-30 flex active/Right UE Active ROM was WFL (within functional limits)/Left UE Active Assistive ROM was WFL  (within functional limits)

## 2017-03-20 NOTE — SWALLOW BEDSIDE ASSESSMENT ADULT - SWALLOW EVAL: DIAGNOSIS
1. The patient demonstrated a mild oral dysphagia for puree, honey thick, and nectar thick liquid textures marked by delayed bolus collection, transfer, and posterior transport. 2. The patient demonstrated a mild pharyngeal dysphagia for puree, honey thick, and nectar thick liquid textures marked by delayed swallow trigger with reduced hyolaryngeal elevation upon digital palpation without any evidence of airway penetration. 1. The patient demonstrated a mild oral dysphagia for puree, honey thick, and nectar thick liquid textures marked by delayed bolus collection, transfer, and posterior transport. 2. The patient demonstrated a moderate oral dysphagia for thin liquid textures marked by delayed bolus collection and transfer with suspected posterior loss over the base of tongue. 3. The patient demonstrated a mild pharyngeal dysphagia for puree, honey thick, and nectar thick liquid textures marked by delayed swallow trigger with reduced hyolaryngeal elevation upon digital palpation without any evidence of airway penetration. 4. The patient demonstrated a moderate pharyngeal dysphagia for thin liquid textures marked by delayed swallow trigger with reduced hyolaryngeal elevation upon palpation resulting in throat clearing suggesting airway penetration.

## 2017-03-20 NOTE — SWALLOW BEDSIDE ASSESSMENT ADULT - SWALLOW EVAL: RECOMMENDED FEEDING/EATING TECHNIQUES
hard swallow w/ each bite or sip/position upright (90 degrees)/no straws/alternate food with liquid/small sips/bites/maintain upright posture during/after eating for 30 mins/check mouth frequently for oral residue/pocketing/allow for swallow between intakes/crush medication (when feasible)/oral hygiene

## 2017-03-21 LAB
BUN SERPL-MCNC: 26 MG/DL — HIGH (ref 7–23)
CALCIUM SERPL-MCNC: 9.3 MG/DL — SIGNIFICANT CHANGE UP (ref 8.4–10.5)
CHLORIDE SERPL-SCNC: 109 MMOL/L — HIGH (ref 98–107)
CO2 SERPL-SCNC: 26 MMOL/L — SIGNIFICANT CHANGE UP (ref 22–31)
CREAT SERPL-MCNC: 0.93 MG/DL — SIGNIFICANT CHANGE UP (ref 0.5–1.3)
GLUCOSE SERPL-MCNC: 100 MG/DL — HIGH (ref 70–99)
HCT VFR BLD CALC: 34.5 % — SIGNIFICANT CHANGE UP (ref 34.5–45)
HGB BLD-MCNC: 11 G/DL — LOW (ref 11.5–15.5)
MAGNESIUM SERPL-MCNC: 2.5 MG/DL — SIGNIFICANT CHANGE UP (ref 1.6–2.6)
MCHC RBC-ENTMCNC: 28.5 PG — SIGNIFICANT CHANGE UP (ref 27–34)
MCHC RBC-ENTMCNC: 31.9 % — LOW (ref 32–36)
MCV RBC AUTO: 89.4 FL — SIGNIFICANT CHANGE UP (ref 80–100)
PHOSPHATE SERPL-MCNC: 2.7 MG/DL — SIGNIFICANT CHANGE UP (ref 2.5–4.5)
PLATELET # BLD AUTO: 117 K/UL — LOW (ref 150–400)
PMV BLD: 12 FL — SIGNIFICANT CHANGE UP (ref 7–13)
POTASSIUM SERPL-MCNC: 4.5 MMOL/L — SIGNIFICANT CHANGE UP (ref 3.5–5.3)
POTASSIUM SERPL-SCNC: 4.5 MMOL/L — SIGNIFICANT CHANGE UP (ref 3.5–5.3)
RBC # BLD: 3.86 M/UL — SIGNIFICANT CHANGE UP (ref 3.8–5.2)
RBC # FLD: 14.1 % — SIGNIFICANT CHANGE UP (ref 10.3–14.5)
SODIUM SERPL-SCNC: 148 MMOL/L — HIGH (ref 135–145)
WBC # BLD: 5.61 K/UL — SIGNIFICANT CHANGE UP (ref 3.8–10.5)
WBC # FLD AUTO: 5.61 K/UL — SIGNIFICANT CHANGE UP (ref 3.8–10.5)

## 2017-03-21 PROCEDURE — 70549 MR ANGIOGRAPH NECK W/O&W/DYE: CPT | Mod: 26

## 2017-03-21 PROCEDURE — 99233 SBSQ HOSP IP/OBS HIGH 50: CPT | Mod: GC

## 2017-03-21 PROCEDURE — 70546 MR ANGIOGRAPH HEAD W/O&W/DYE: CPT | Mod: 26

## 2017-03-21 RX ORDER — ACETAMINOPHEN 500 MG
650 TABLET ORAL EVERY 6 HOURS
Qty: 0 | Refills: 0 | Status: DISCONTINUED | OUTPATIENT
Start: 2017-03-21 | End: 2017-03-22

## 2017-03-21 RX ADMIN — Medication 500000 UNIT(S): at 00:28

## 2017-03-21 RX ADMIN — Medication 500000 UNIT(S): at 12:32

## 2017-03-21 RX ADMIN — Medication 500000 UNIT(S): at 06:16

## 2017-03-21 RX ADMIN — HEPARIN SODIUM 5000 UNIT(S): 5000 INJECTION INTRAVENOUS; SUBCUTANEOUS at 19:00

## 2017-03-21 RX ADMIN — Medication 500000 UNIT(S): at 22:46

## 2017-03-21 RX ADMIN — SODIUM CHLORIDE 75 MILLILITER(S): 9 INJECTION, SOLUTION INTRAVENOUS at 06:17

## 2017-03-21 RX ADMIN — ATORVASTATIN CALCIUM 40 MILLIGRAM(S): 80 TABLET, FILM COATED ORAL at 22:45

## 2017-03-21 RX ADMIN — Medication 500000 UNIT(S): at 19:01

## 2017-03-21 RX ADMIN — Medication 81 MILLIGRAM(S): at 12:32

## 2017-03-21 RX ADMIN — ATORVASTATIN CALCIUM 40 MILLIGRAM(S): 80 TABLET, FILM COATED ORAL at 00:28

## 2017-03-21 RX ADMIN — HEPARIN SODIUM 5000 UNIT(S): 5000 INJECTION INTRAVENOUS; SUBCUTANEOUS at 06:16

## 2017-03-22 VITALS
OXYGEN SATURATION: 98 % | TEMPERATURE: 98 F | HEART RATE: 56 BPM | SYSTOLIC BLOOD PRESSURE: 121 MMHG | RESPIRATION RATE: 18 BRPM | DIASTOLIC BLOOD PRESSURE: 54 MMHG

## 2017-03-22 PROCEDURE — 99239 HOSP IP/OBS DSCHRG MGMT >30: CPT

## 2017-03-22 RX ORDER — PNEUMOCOCCAL 23-VAL P-SAC VAC 25MCG/0.5
0.5 VIAL (ML) INJECTION ONCE
Qty: 0 | Refills: 0 | Status: COMPLETED | OUTPATIENT
Start: 2017-03-22 | End: 2017-03-22

## 2017-03-22 RX ORDER — INFLUENZA VIRUS VACCINE 15; 15; 15; 15 UG/.5ML; UG/.5ML; UG/.5ML; UG/.5ML
0.5 SUSPENSION INTRAMUSCULAR ONCE
Qty: 0 | Refills: 0 | Status: COMPLETED | OUTPATIENT
Start: 2017-03-22 | End: 2017-03-22

## 2017-03-22 RX ORDER — ASPIRIN/CALCIUM CARB/MAGNESIUM 324 MG
81 TABLET ORAL DAILY
Qty: 0 | Refills: 0 | Status: DISCONTINUED | OUTPATIENT
Start: 2017-03-22 | End: 2017-03-22

## 2017-03-22 RX ORDER — ACETAMINOPHEN 500 MG
2 TABLET ORAL
Qty: 0 | Refills: 0 | COMMUNITY
Start: 2017-03-22

## 2017-03-22 RX ORDER — ASPIRIN/CALCIUM CARB/MAGNESIUM 324 MG
1 TABLET ORAL
Qty: 0 | Refills: 0 | COMMUNITY
Start: 2017-03-22

## 2017-03-22 RX ORDER — ATORVASTATIN CALCIUM 80 MG/1
40 TABLET, FILM COATED ORAL AT BEDTIME
Qty: 0 | Refills: 0 | Status: DISCONTINUED | OUTPATIENT
Start: 2017-03-22 | End: 2017-03-22

## 2017-03-22 RX ORDER — NYSTATIN 500MM UNIT
5 POWDER (EA) MISCELLANEOUS
Qty: 0 | Refills: 0 | COMMUNITY
Start: 2017-03-22

## 2017-03-22 RX ORDER — ATORVASTATIN CALCIUM 80 MG/1
1 TABLET, FILM COATED ORAL
Qty: 0 | Refills: 0 | COMMUNITY
Start: 2017-03-22

## 2017-03-22 RX ADMIN — Medication 500000 UNIT(S): at 05:48

## 2017-03-22 RX ADMIN — Medication 500000 UNIT(S): at 12:16

## 2017-03-22 RX ADMIN — HEPARIN SODIUM 5000 UNIT(S): 5000 INJECTION INTRAVENOUS; SUBCUTANEOUS at 05:48

## 2017-03-22 RX ADMIN — Medication 0.5 MILLILITER(S): at 16:26

## 2017-03-22 RX ADMIN — Medication 81 MILLIGRAM(S): at 14:31

## 2017-03-22 RX ADMIN — INFLUENZA VIRUS VACCINE 0.5 MILLILITER(S): 15; 15; 15; 15 SUSPENSION INTRAMUSCULAR at 16:25

## 2018-11-27 NOTE — PHYSICAL THERAPY INITIAL EVALUATION ADULT - DIAGNOSIS, PT EVAL
Pt admitted for CVA; MRI + right basal ganglia acute versus acute/subacute infarction. Pt presents with decreased strength, decreased sitting balance, decreased coordination, slurred speech, and aphasia 74.8

## 2018-12-01 ENCOUNTER — PRIOR ORIGINAL RECORDS (OUTPATIENT)
Dept: HEALTH INFORMATION MANAGEMENT | Facility: OTHER | Age: 83
End: 2018-12-01

## 2019-05-23 ENCOUNTER — TELEPHONE (OUTPATIENT)
Dept: SCHEDULING | Age: 84
End: 2019-05-23

## 2019-05-24 ENCOUNTER — OFFICE VISIT (OUTPATIENT)
Dept: INTERNAL MEDICINE | Age: 84
End: 2019-05-24

## 2019-05-24 ENCOUNTER — APPOINTMENT (OUTPATIENT)
Dept: INTERNAL MEDICINE | Age: 84
End: 2019-05-24

## 2019-05-24 VITALS
WEIGHT: 119.05 LBS | OXYGEN SATURATION: 100 % | SYSTOLIC BLOOD PRESSURE: 124 MMHG | HEART RATE: 74 BPM | DIASTOLIC BLOOD PRESSURE: 62 MMHG

## 2019-05-24 DIAGNOSIS — M25.511 CHRONIC PAIN OF BOTH SHOULDERS: Primary | ICD-10-CM

## 2019-05-24 DIAGNOSIS — G89.29 CHRONIC PAIN OF BOTH SHOULDERS: Primary | ICD-10-CM

## 2019-05-24 DIAGNOSIS — M79.672 LEFT FOOT PAIN: ICD-10-CM

## 2019-05-24 DIAGNOSIS — M25.512 CHRONIC PAIN OF BOTH SHOULDERS: Primary | ICD-10-CM

## 2019-05-24 PROCEDURE — 99202 OFFICE O/P NEW SF 15 MIN: CPT | Performed by: PHYSICIAN ASSISTANT

## 2019-05-24 SDOH — HEALTH STABILITY: MENTAL HEALTH: HOW OFTEN DO YOU HAVE A DRINK CONTAINING ALCOHOL?: NEVER

## 2019-05-24 ASSESSMENT — PATIENT HEALTH QUESTIONNAIRE - PHQ9
2. FEELING DOWN, DEPRESSED OR HOPELESS: NOT AT ALL
SUM OF ALL RESPONSES TO PHQ9 QUESTIONS 1 AND 2: 0
SUM OF ALL RESPONSES TO PHQ9 QUESTIONS 1 AND 2: 0
1. LITTLE INTEREST OR PLEASURE IN DOING THINGS: NOT AT ALL

## 2019-05-29 PROBLEM — M25.511 CHRONIC PAIN OF BOTH SHOULDERS: Status: ACTIVE | Noted: 2019-05-29

## 2019-05-29 PROBLEM — M25.512 CHRONIC PAIN OF BOTH SHOULDERS: Status: ACTIVE | Noted: 2019-05-29

## 2019-05-29 PROBLEM — G89.29 CHRONIC PAIN OF BOTH SHOULDERS: Status: ACTIVE | Noted: 2019-05-29

## 2019-05-29 PROBLEM — M79.672 LEFT FOOT PAIN: Status: ACTIVE | Noted: 2019-05-29

## 2019-05-29 ASSESSMENT — ENCOUNTER SYMPTOMS
BACK PAIN: 0
RHINORRHEA: 0
HEADACHES: 0
DIZZINESS: 0
NAUSEA: 0
UNEXPECTED WEIGHT CHANGE: 0
CONSTIPATION: 0
NERVOUS/ANXIOUS: 0
ABDOMINAL PAIN: 0
WHEEZING: 0
ADENOPATHY: 0
AGITATION: 0
WEAKNESS: 0
COUGH: 0
VOMITING: 0
CHILLS: 0
FEVER: 0
SHORTNESS OF BREATH: 0
BLOOD IN STOOL: 0

## 2019-08-15 ENCOUNTER — TELEPHONE (OUTPATIENT)
Dept: SCHEDULING | Age: 84
End: 2019-08-15

## 2019-08-20 ENCOUNTER — OFFICE VISIT (OUTPATIENT)
Dept: FAMILY MEDICINE | Age: 84
End: 2019-08-20

## 2019-08-20 ENCOUNTER — LAB SERVICES (OUTPATIENT)
Dept: LAB | Age: 84
End: 2019-08-20

## 2019-08-20 VITALS
HEIGHT: 60 IN | DIASTOLIC BLOOD PRESSURE: 70 MMHG | HEART RATE: 101 BPM | WEIGHT: 119.93 LBS | SYSTOLIC BLOOD PRESSURE: 122 MMHG | OXYGEN SATURATION: 100 % | BODY MASS INDEX: 23.55 KG/M2

## 2019-08-20 DIAGNOSIS — G89.29 CHRONIC PAIN OF BOTH SHOULDERS: ICD-10-CM

## 2019-08-20 DIAGNOSIS — M25.511 CHRONIC PAIN OF BOTH SHOULDERS: Primary | ICD-10-CM

## 2019-08-20 DIAGNOSIS — Z13.29 SCREENING FOR THYROID DISORDER: ICD-10-CM

## 2019-08-20 DIAGNOSIS — Z13.220 LIPID SCREENING: ICD-10-CM

## 2019-08-20 DIAGNOSIS — I63.9 CEREBROVASCULAR ACCIDENT (CVA), UNSPECIFIED MECHANISM (CMD): ICD-10-CM

## 2019-08-20 DIAGNOSIS — I83.93 VARICOSE VEINS OF BOTH LOWER EXTREMITIES, UNSPECIFIED WHETHER COMPLICATED: ICD-10-CM

## 2019-08-20 DIAGNOSIS — R10.32 LEFT GROIN PAIN: ICD-10-CM

## 2019-08-20 DIAGNOSIS — M25.512 CHRONIC PAIN OF BOTH SHOULDERS: ICD-10-CM

## 2019-08-20 DIAGNOSIS — Z00.00 ANNUAL PHYSICAL EXAM: ICD-10-CM

## 2019-08-20 DIAGNOSIS — Z13.0 SCREENING, ANEMIA, DEFICIENCY, IRON: ICD-10-CM

## 2019-08-20 DIAGNOSIS — M79.89 SWELLING OF LOWER LEG: ICD-10-CM

## 2019-08-20 DIAGNOSIS — Z13.1 SCREENING FOR DIABETES MELLITUS (DM): ICD-10-CM

## 2019-08-20 DIAGNOSIS — M25.511 CHRONIC PAIN OF BOTH SHOULDERS: ICD-10-CM

## 2019-08-20 DIAGNOSIS — G89.29 CHRONIC PAIN OF BOTH SHOULDERS: Primary | ICD-10-CM

## 2019-08-20 DIAGNOSIS — M25.512 CHRONIC PAIN OF BOTH SHOULDERS: Primary | ICD-10-CM

## 2019-08-20 LAB
ALBUMIN SERPL-MCNC: 3.8 G/DL (ref 3.6–5.1)
ALBUMIN/GLOB SERPL: 1.4 {RATIO} (ref 1–2.4)
ALP SERPL-CCNC: 88 UNITS/L (ref 45–117)
ALT SERPL-CCNC: 19 UNITS/L
ANION GAP SERPL CALC-SCNC: 9 MMOL/L (ref 10–20)
AST SERPL-CCNC: 15 UNITS/L
BASOPHILS # BLD AUTO: 0.1 K/MCL (ref 0–0.3)
BASOPHILS NFR BLD AUTO: 2 %
BILIRUB SERPL-MCNC: 0.4 MG/DL (ref 0.2–1)
BUN SERPL-MCNC: 22 MG/DL (ref 6–20)
BUN/CREAT SERPL: 28 (ref 7–25)
CALCIUM SERPL-MCNC: 9.2 MG/DL (ref 8.4–10.2)
CHLORIDE SERPL-SCNC: 108 MMOL/L (ref 98–107)
CHOLEST SERPL-MCNC: 204 MG/DL
CHOLEST/HDLC SERPL: 3.2 {RATIO}
CO2 SERPL-SCNC: 30 MMOL/L (ref 21–32)
CREAT SERPL-MCNC: 0.8 MG/DL (ref 0.51–0.95)
DIFFERENTIAL METHOD BLD: ABNORMAL
EOSINOPHIL # BLD AUTO: 0.1 K/MCL (ref 0.1–0.5)
EOSINOPHIL NFR SPEC: 2 %
ERYTHROCYTE [DISTWIDTH] IN BLOOD: 12.9 % (ref 11–15)
FASTING STATUS PATIENT QL REPORTED: ABNORMAL HRS
GLOBULIN SER-MCNC: 2.7 G/DL (ref 2–4)
GLUCOSE SERPL-MCNC: 86 MG/DL (ref 65–99)
HBA1C MFR BLD: 5.3 % (ref 4.5–5.6)
HCT VFR BLD CALC: 39.3 % (ref 36–46.5)
HDLC SERPL-MCNC: 64 MG/DL
HGB BLD-MCNC: 11.8 G/DL (ref 12–15.5)
IMM GRANULOCYTES # BLD AUTO: 0 K/MCL (ref 0–0.2)
IMM GRANULOCYTES NFR BLD: 0 %
LDLC SERPL-MCNC: 121 MG/DL
LENGTH OF FAST TIME PATIENT: ABNORMAL HRS
LYMPHOCYTES # BLD MANUAL: 1.6 K/MCL (ref 1–4)
LYMPHOCYTES NFR BLD MANUAL: 32 %
MCH RBC QN AUTO: 29.4 PG (ref 26–34)
MCHC RBC AUTO-ENTMCNC: 30 G/DL (ref 32–36.5)
MCV RBC AUTO: 97.8 FL (ref 78–100)
MONOCYTES # BLD MANUAL: 0.5 K/MCL (ref 0.3–0.9)
MONOCYTES NFR BLD MANUAL: 10 %
NEUTROPHILS # BLD: 2.8 K/MCL (ref 1.8–7.7)
NEUTROPHILS NFR BLD AUTO: 54 %
NONHDLC SERPL-MCNC: 140 MG/DL
NRBC BLD MANUAL-RTO: 0 /100 WBC
PLATELET # BLD: 274 K/MCL (ref 140–450)
POTASSIUM SERPL-SCNC: 4.1 MMOL/L (ref 3.4–5.1)
PROT SERPL-MCNC: 6.5 G/DL (ref 6.4–8.2)
RBC # BLD: 4.02 MIL/MCL (ref 4–5.2)
SODIUM SERPL-SCNC: 143 MMOL/L (ref 135–145)
TRIGL SERPL-MCNC: 93 MG/DL
TSH SERPL-ACNC: 1.53 MCUNITS/ML (ref 0.35–5)
WBC # BLD: 5.2 K/MCL (ref 4.2–11)

## 2019-08-20 PROCEDURE — 36415 COLL VENOUS BLD VENIPUNCTURE: CPT

## 2019-08-20 PROCEDURE — 99204 OFFICE O/P NEW MOD 45 MIN: CPT | Performed by: FAMILY MEDICINE

## 2019-08-20 ASSESSMENT — PATIENT HEALTH QUESTIONNAIRE - PHQ9
2. FEELING DOWN, DEPRESSED OR HOPELESS: NOT AT ALL
SUM OF ALL RESPONSES TO PHQ9 QUESTIONS 1 AND 2: 0
1. LITTLE INTEREST OR PLEASURE IN DOING THINGS: NOT AT ALL
SUM OF ALL RESPONSES TO PHQ9 QUESTIONS 1 AND 2: 0

## 2019-08-21 LAB
CREAT UR-MCNC: 76.3 MG/DL
MICROALBUMIN UR-MCNC: 1.62 MG/DL
MICROALBUMIN/CREAT UR: 21.2 MG/G

## 2019-09-04 ENCOUNTER — TELEPHONE (OUTPATIENT)
Dept: CARDIOLOGY | Age: 84
End: 2019-09-04

## 2019-09-07 ENCOUNTER — HOSPITAL (OUTPATIENT)
Dept: OTHER | Age: 84
End: 2019-09-07
Attending: FAMILY MEDICINE

## 2019-09-07 DIAGNOSIS — M25.512 CHRONIC PAIN OF BOTH SHOULDERS: Primary | ICD-10-CM

## 2019-09-07 DIAGNOSIS — M25.511 CHRONIC PAIN OF BOTH SHOULDERS: Primary | ICD-10-CM

## 2019-09-07 DIAGNOSIS — G89.29 CHRONIC PAIN OF BOTH SHOULDERS: Primary | ICD-10-CM

## 2019-09-13 ENCOUNTER — TELEPHONE (OUTPATIENT)
Dept: FAMILY MEDICINE | Age: 84
End: 2019-09-13

## 2019-09-19 ENCOUNTER — HOSPITAL (OUTPATIENT)
Dept: OTHER | Age: 84
End: 2019-09-19
Attending: FAMILY MEDICINE

## 2019-09-23 ENCOUNTER — HOSPITAL (OUTPATIENT)
Dept: OTHER | Age: 84
End: 2019-09-23
Attending: FAMILY MEDICINE

## 2019-10-01 ENCOUNTER — HOSPITAL (OUTPATIENT)
Dept: OTHER | Age: 84
End: 2019-10-01
Attending: FAMILY MEDICINE

## 2019-10-04 ENCOUNTER — APPOINTMENT (OUTPATIENT)
Dept: CARDIOLOGY | Age: 84
End: 2019-10-04

## 2019-10-08 ENCOUNTER — TELEPHONE (OUTPATIENT)
Dept: SCHEDULING | Age: 84
End: 2019-10-08

## 2019-10-21 ENCOUNTER — APPOINTMENT (OUTPATIENT)
Dept: CARDIOLOGY | Age: 84
End: 2019-10-21

## 2019-10-25 ENCOUNTER — LAB SERVICES (OUTPATIENT)
Dept: LAB | Age: 84
End: 2019-10-25

## 2019-10-25 ENCOUNTER — OFFICE VISIT (OUTPATIENT)
Dept: FAMILY MEDICINE | Age: 84
End: 2019-10-25

## 2019-10-25 VITALS
SYSTOLIC BLOOD PRESSURE: 132 MMHG | RESPIRATION RATE: 18 BRPM | HEIGHT: 60 IN | BODY MASS INDEX: 23.05 KG/M2 | DIASTOLIC BLOOD PRESSURE: 58 MMHG | WEIGHT: 117.39 LBS | HEART RATE: 66 BPM | OXYGEN SATURATION: 98 %

## 2019-10-25 DIAGNOSIS — Z13.220 LIPID SCREENING: ICD-10-CM

## 2019-10-25 DIAGNOSIS — L60.2 LONG TOENAIL: Primary | ICD-10-CM

## 2019-10-25 DIAGNOSIS — G47.00 INSOMNIA, UNSPECIFIED TYPE: ICD-10-CM

## 2019-10-25 DIAGNOSIS — Z13.1 SCREENING FOR DIABETES MELLITUS: Primary | ICD-10-CM

## 2019-10-25 LAB
ALBUMIN SERPL-MCNC: 3.8 G/DL (ref 3.6–5.1)
ALBUMIN/GLOB SERPL: 1.2 {RATIO} (ref 1–2.4)
ALP SERPL-CCNC: 90 UNITS/L (ref 45–117)
ALT SERPL-CCNC: 17 UNITS/L
ANION GAP SERPL CALC-SCNC: 9 MMOL/L (ref 10–20)
AST SERPL-CCNC: 13 UNITS/L
BASOPHILS # BLD AUTO: 0.1 K/MCL (ref 0–0.3)
BASOPHILS NFR BLD AUTO: 1 %
BILIRUB SERPL-MCNC: 0.4 MG/DL (ref 0.2–1)
BUN SERPL-MCNC: 17 MG/DL (ref 6–20)
BUN/CREAT SERPL: 21 (ref 7–25)
CALCIUM SERPL-MCNC: 9.2 MG/DL (ref 8.4–10.2)
CHLORIDE SERPL-SCNC: 108 MMOL/L (ref 98–107)
CHOLEST SERPL-MCNC: 225 MG/DL
CHOLEST/HDLC SERPL: 3.4 {RATIO}
CO2 SERPL-SCNC: 31 MMOL/L (ref 21–32)
CREAT SERPL-MCNC: 0.8 MG/DL (ref 0.51–0.95)
DIFFERENTIAL METHOD BLD: ABNORMAL
EOSINOPHIL # BLD AUTO: 0 K/MCL (ref 0.1–0.5)
EOSINOPHIL NFR SPEC: 1 %
ERYTHROCYTE [DISTWIDTH] IN BLOOD: 12.7 % (ref 11–15)
FASTING STATUS PATIENT QL REPORTED: ABNORMAL HRS
GLOBULIN SER-MCNC: 3.2 G/DL (ref 2–4)
GLUCOSE SERPL-MCNC: 104 MG/DL (ref 65–99)
HCT VFR BLD CALC: 38.1 % (ref 36–46.5)
HDLC SERPL-MCNC: 67 MG/DL
HGB BLD-MCNC: 11.9 G/DL (ref 12–15.5)
IMM GRANULOCYTES # BLD AUTO: 0 K/MCL (ref 0–0.2)
IMM GRANULOCYTES NFR BLD: 1 %
LDLC SERPL-MCNC: 143 MG/DL
LENGTH OF FAST TIME PATIENT: ABNORMAL HRS
LYMPHOCYTES # BLD MANUAL: 1.5 K/MCL (ref 1–4)
LYMPHOCYTES NFR BLD MANUAL: 35 %
MCH RBC QN AUTO: 29.5 PG (ref 26–34)
MCHC RBC AUTO-ENTMCNC: 31.2 G/DL (ref 32–36.5)
MCV RBC AUTO: 94.5 FL (ref 78–100)
MONOCYTES # BLD MANUAL: 0.4 K/MCL (ref 0.3–0.9)
MONOCYTES NFR BLD MANUAL: 10 %
NEUTROPHILS # BLD: 2.2 K/MCL (ref 1.8–7.7)
NEUTROPHILS NFR BLD AUTO: 52 %
NONHDLC SERPL-MCNC: 158 MG/DL
NRBC BLD MANUAL-RTO: 0 /100 WBC
PLATELET # BLD: 254 K/MCL (ref 140–450)
POTASSIUM SERPL-SCNC: 4.3 MMOL/L (ref 3.4–5.1)
PROT SERPL-MCNC: 7 G/DL (ref 6.4–8.2)
RBC # BLD: 4.03 MIL/MCL (ref 4–5.2)
SODIUM SERPL-SCNC: 144 MMOL/L (ref 135–145)
TRIGL SERPL-MCNC: 77 MG/DL
WBC # BLD: 4.2 K/MCL (ref 4.2–11)

## 2019-10-25 PROCEDURE — 36415 COLL VENOUS BLD VENIPUNCTURE: CPT

## 2019-10-25 PROCEDURE — 99213 OFFICE O/P EST LOW 20 MIN: CPT | Performed by: FAMILY MEDICINE

## 2019-10-25 RX ORDER — CHOLECALCIFEROL (VITAMIN D3) 125 MCG
5 CAPSULE ORAL NIGHTLY
Qty: 30 TABLET | Refills: 1 | Status: SHIPPED | OUTPATIENT
Start: 2019-10-25 | End: 2019-12-16 | Stop reason: SDUPTHER

## 2019-11-01 ENCOUNTER — HOSPITAL (OUTPATIENT)
Dept: OTHER | Age: 84
End: 2019-11-01
Attending: FAMILY MEDICINE

## 2019-12-05 ENCOUNTER — TELEPHONE (OUTPATIENT)
Dept: SCHEDULING | Age: 84
End: 2019-12-05

## 2019-12-16 ENCOUNTER — OFFICE VISIT (OUTPATIENT)
Dept: FAMILY MEDICINE | Age: 84
End: 2019-12-16

## 2019-12-16 VITALS
WEIGHT: 119.71 LBS | BODY MASS INDEX: 23.5 KG/M2 | HEIGHT: 60 IN | SYSTOLIC BLOOD PRESSURE: 129 MMHG | HEART RATE: 76 BPM | DIASTOLIC BLOOD PRESSURE: 70 MMHG | OXYGEN SATURATION: 100 %

## 2019-12-16 DIAGNOSIS — G47.00 INSOMNIA, UNSPECIFIED TYPE: ICD-10-CM

## 2019-12-16 DIAGNOSIS — G89.29 CHRONIC PAIN OF BOTH SHOULDERS: Primary | ICD-10-CM

## 2019-12-16 DIAGNOSIS — M79.672 LEFT FOOT PAIN: ICD-10-CM

## 2019-12-16 DIAGNOSIS — M25.511 CHRONIC PAIN OF BOTH SHOULDERS: Primary | ICD-10-CM

## 2019-12-16 DIAGNOSIS — F32.0 MILD MAJOR DEPRESSION (CMD): ICD-10-CM

## 2019-12-16 DIAGNOSIS — I83.93 VARICOSE VEINS OF BOTH LOWER EXTREMITIES, UNSPECIFIED WHETHER COMPLICATED: ICD-10-CM

## 2019-12-16 DIAGNOSIS — I73.9 PVD (PERIPHERAL VASCULAR DISEASE) (CMD): ICD-10-CM

## 2019-12-16 DIAGNOSIS — G62.9 NEUROPATHY: ICD-10-CM

## 2019-12-16 DIAGNOSIS — M25.512 CHRONIC PAIN OF BOTH SHOULDERS: Primary | ICD-10-CM

## 2019-12-16 PROCEDURE — 99214 OFFICE O/P EST MOD 30 MIN: CPT | Performed by: FAMILY MEDICINE

## 2019-12-16 RX ORDER — CHOLECALCIFEROL (VITAMIN D3) 125 MCG
5 CAPSULE ORAL NIGHTLY
Qty: 30 TABLET | Refills: 1 | Status: SHIPPED | OUTPATIENT
Start: 2019-12-16 | End: 2020-03-19

## 2019-12-16 RX ORDER — GABAPENTIN 300 MG/1
300 CAPSULE ORAL DAILY
Qty: 30 CAPSULE | Refills: 2 | Status: SHIPPED | OUTPATIENT
Start: 2019-12-16 | End: 2020-04-13

## 2019-12-16 RX ORDER — ESCITALOPRAM OXALATE 5 MG/1
5 TABLET ORAL DAILY
Qty: 30 TABLET | Refills: 2 | Status: SHIPPED | OUTPATIENT
Start: 2019-12-16 | End: 2020-04-13

## 2019-12-16 SDOH — HEALTH STABILITY: MENTAL HEALTH: HOW OFTEN DO YOU HAVE A DRINK CONTAINING ALCOHOL?: NEVER

## 2019-12-16 ASSESSMENT — PATIENT HEALTH QUESTIONNAIRE - PHQ9
5. POOR APPETITE, WEIGHT LOSS, OR OVEREATING: NOT AT ALL
SUM OF ALL RESPONSES TO PHQ9 QUESTIONS 1 TO 9: 9
CLINICAL INTERPRETATION OF PHQ9 SCORE: MILD DEPRESSION
1. LITTLE INTEREST OR PLEASURE IN DOING THINGS: MORE THAN HALF THE DAYS
6. FEELING BAD ABOUT YOURSELF - OR THAT YOU ARE A FAILURE OR HAVE LET YOURSELF OR YOUR FAMILY DOWN: SEVERAL DAYS
SUM OF ALL RESPONSES TO PHQ9 QUESTIONS 1 AND 2: 2
8. MOVING OR SPEAKING SO SLOWLY THAT OTHER PEOPLE COULD HAVE NOTICED. OR THE OPPOSITE, BEING SO FIGETY OR RESTLESS THAT YOU HAVE BEEN MOVING AROUND A LOT MORE THAN USUAL: MORE THAN HALF THE DAYS
9. THOUGHTS THAT YOU WOULD BE BETTER OFF DEAD, OR OF HURTING YOURSELF: NOT AT ALL
3. TROUBLE FALLING OR STAYING ASLEEP OR SLEEPING TOO MUCH: MORE THAN HALF THE DAYS
4. FEELING TIRED OR HAVING LITTLE ENERGY: MORE THAN HALF THE DAYS
SUM OF ALL RESPONSES TO PHQ QUESTIONS 1-9: 9
SUM OF ALL RESPONSES TO PHQ9 QUESTIONS 1 AND 2: 2
7. TROUBLE CONCENTRATING ON THINGS, SUCH AS READING THE NEWSPAPER OR WATCHING TELEVISION: NOT AT ALL
2. FEELING DOWN, DEPRESSED OR HOPELESS: NOT AT ALL
10. IF YOU CHECKED OFF ANY PROBLEMS, HOW DIFFICULT HAVE THESE PROBLEMS MADE IT FOR YOU TO DO YOUR WORK, TAKE CARE OF THINGS AT HOME, OR GET ALONG WITH OTHER PEOPLE: SOMEWHAT DIFFICULT

## 2020-01-06 ENCOUNTER — APPOINTMENT (OUTPATIENT)
Dept: CARDIOLOGY | Age: 85
End: 2020-01-06
Attending: FAMILY MEDICINE

## 2020-01-22 ENCOUNTER — TELEPHONE (OUTPATIENT)
Dept: SCHEDULING | Age: 85
End: 2020-01-22

## 2020-01-23 ENCOUNTER — APPOINTMENT (OUTPATIENT)
Dept: INTERNAL MEDICINE | Age: 85
End: 2020-01-23

## 2020-01-31 ENCOUNTER — TELEPHONE (OUTPATIENT)
Dept: SCHEDULING | Age: 85
End: 2020-01-31

## 2020-01-31 ENCOUNTER — OFFICE VISIT (OUTPATIENT)
Dept: CARDIOLOGY | Age: 85
End: 2020-01-31

## 2020-01-31 VITALS
HEART RATE: 52 BPM | DIASTOLIC BLOOD PRESSURE: 54 MMHG | BODY MASS INDEX: 24.77 KG/M2 | HEIGHT: 58 IN | SYSTOLIC BLOOD PRESSURE: 102 MMHG | WEIGHT: 118 LBS

## 2020-01-31 DIAGNOSIS — R60.9 EDEMA, UNSPECIFIED TYPE: ICD-10-CM

## 2020-01-31 DIAGNOSIS — I63.9 CEREBROVASCULAR ACCIDENT (CVA), UNSPECIFIED MECHANISM (CMD): ICD-10-CM

## 2020-01-31 DIAGNOSIS — M79.605 PAIN OF LEFT LOWER EXTREMITY: Primary | ICD-10-CM

## 2020-01-31 PROCEDURE — 99205 OFFICE O/P NEW HI 60 MIN: CPT | Performed by: INTERNAL MEDICINE

## 2020-01-31 SDOH — HEALTH STABILITY: MENTAL HEALTH: HOW OFTEN DO YOU HAVE A DRINK CONTAINING ALCOHOL?: NEVER

## 2020-01-31 SDOH — SOCIAL STABILITY: SOCIAL NETWORK: ARE YOU MARRIED, WIDOWED, DIVORCED, SEPARATED, NEVER MARRIED, OR LIVING WITH A PARTNER?: WIDOWED

## 2020-01-31 SDOH — HEALTH STABILITY: PHYSICAL HEALTH: ON AVERAGE, HOW MANY DAYS PER WEEK DO YOU ENGAGE IN MODERATE TO STRENUOUS EXERCISE (LIKE A BRISK WALK)?: 0 DAYS

## 2020-01-31 SDOH — HEALTH STABILITY: PHYSICAL HEALTH: ON AVERAGE, HOW MANY MINUTES DO YOU ENGAGE IN EXERCISE AT THIS LEVEL?: 0 MIN

## 2020-01-31 ASSESSMENT — ENCOUNTER SYMPTOMS
LIGHT-HEADEDNESS: 1
FEVER: 0
COUGH: 0
ALLERGIC/IMMUNOLOGIC COMMENTS: NO NEW FOOD ALLERGIES
SUSPICIOUS LESIONS: 0
HEMOPTYSIS: 0
WEIGHT LOSS: 0
BRUISES/BLEEDS EASILY: 0
CHILLS: 0
WEIGHT GAIN: 0
HEMATOCHEZIA: 0

## 2020-01-31 ASSESSMENT — PATIENT HEALTH QUESTIONNAIRE - PHQ9
SUM OF ALL RESPONSES TO PHQ9 QUESTIONS 1 AND 2: 0
1. LITTLE INTEREST OR PLEASURE IN DOING THINGS: NOT AT ALL
SUM OF ALL RESPONSES TO PHQ9 QUESTIONS 1 AND 2: 0
2. FEELING DOWN, DEPRESSED OR HOPELESS: NOT AT ALL

## 2020-03-19 DIAGNOSIS — G47.00 INSOMNIA, UNSPECIFIED TYPE: ICD-10-CM

## 2020-03-19 RX ORDER — CHOLECALCIFEROL (VITAMIN D3) 125 MCG
5 CAPSULE ORAL NIGHTLY
Qty: 30 TABLET | Refills: 1 | Status: SHIPPED | OUTPATIENT
Start: 2020-03-19 | End: 2020-06-12

## 2020-04-08 ENCOUNTER — OFFICE VISIT (OUTPATIENT)
Dept: INTERNAL MEDICINE | Age: 85
End: 2020-04-08

## 2020-04-08 ENCOUNTER — TELEPHONE (OUTPATIENT)
Dept: SCHEDULING | Age: 85
End: 2020-04-08

## 2020-04-08 DIAGNOSIS — R60.0 BILATERAL LOWER EXTREMITY EDEMA: Primary | ICD-10-CM

## 2020-04-08 PROCEDURE — 99442 TELEPHONE E&M BY PHYSICIAN EST PT NOT ORIG PREV 7 DAYS 11-20 MIN: CPT | Performed by: PHYSICIAN ASSISTANT

## 2020-04-13 DIAGNOSIS — G62.9 NEUROPATHY: ICD-10-CM

## 2020-04-13 DIAGNOSIS — F32.0 MILD MAJOR DEPRESSION (CMD): ICD-10-CM

## 2020-04-13 RX ORDER — ESCITALOPRAM OXALATE 5 MG/1
5 TABLET ORAL DAILY
Qty: 30 TABLET | Refills: 2 | Status: SHIPPED | OUTPATIENT
Start: 2020-04-13 | End: 2020-08-11

## 2020-04-13 RX ORDER — GABAPENTIN 300 MG/1
300 CAPSULE ORAL DAILY
Qty: 30 CAPSULE | Refills: 2 | Status: SHIPPED | OUTPATIENT
Start: 2020-04-13 | End: 2020-09-18 | Stop reason: SDUPTHER

## 2020-04-15 ENCOUNTER — TELEPHONE (OUTPATIENT)
Dept: SCHEDULING | Age: 85
End: 2020-04-15

## 2020-05-01 ENCOUNTER — APPOINTMENT (OUTPATIENT)
Dept: CARDIOLOGY | Age: 85
End: 2020-05-01

## 2020-06-12 ENCOUNTER — OFFICE VISIT (OUTPATIENT)
Dept: CARDIOLOGY | Age: 85
End: 2020-06-12

## 2020-06-12 DIAGNOSIS — G47.00 INSOMNIA, UNSPECIFIED TYPE: ICD-10-CM

## 2020-06-12 DIAGNOSIS — E78.5 DYSLIPIDEMIA: Primary | ICD-10-CM

## 2020-06-12 RX ORDER — CHOLECALCIFEROL (VITAMIN D3) 125 MCG
5 CAPSULE ORAL NIGHTLY
Qty: 30 TABLET | Refills: 1 | Status: SHIPPED | OUTPATIENT
Start: 2020-06-12 | End: 2020-06-26 | Stop reason: SDUPTHER

## 2020-06-16 ENCOUNTER — TELEPHONE (OUTPATIENT)
Dept: SCHEDULING | Age: 85
End: 2020-06-16

## 2020-06-17 ENCOUNTER — TELEPHONE (OUTPATIENT)
Dept: SCHEDULING | Age: 85
End: 2020-06-17

## 2020-06-17 ENCOUNTER — V-VISIT (OUTPATIENT)
Dept: FAMILY MEDICINE | Age: 85
End: 2020-06-17

## 2020-06-17 DIAGNOSIS — E78.5 DYSLIPIDEMIA: ICD-10-CM

## 2020-06-17 DIAGNOSIS — E87.5 HYPERKALEMIA: ICD-10-CM

## 2020-06-17 DIAGNOSIS — R07.89 ATYPICAL CHEST PAIN: ICD-10-CM

## 2020-06-17 DIAGNOSIS — Z86.73 HISTORY OF CVA (CEREBROVASCULAR ACCIDENT): ICD-10-CM

## 2020-06-17 DIAGNOSIS — R60.0 BILATERAL LOWER EXTREMITY EDEMA: Primary | ICD-10-CM

## 2020-06-17 PROBLEM — M79.605 PAIN OF LEFT LOWER EXTREMITY: Status: RESOLVED | Noted: 2019-05-29 | Resolved: 2020-06-17

## 2020-06-17 PROBLEM — R60.9 EDEMA: Status: RESOLVED | Noted: 2020-01-31 | Resolved: 2020-06-17

## 2020-06-17 PROCEDURE — 99442 TELEPHONE E&M BY PHYSICIAN EST PT NOT ORIG PREV 7 DAYS 11-20 MIN: CPT | Performed by: FAMILY MEDICINE

## 2020-06-17 SDOH — HEALTH STABILITY: MENTAL HEALTH: HOW OFTEN DO YOU HAVE A DRINK CONTAINING ALCOHOL?: NEVER

## 2020-06-17 ASSESSMENT — PATIENT HEALTH QUESTIONNAIRE - PHQ9
SUM OF ALL RESPONSES TO PHQ9 QUESTIONS 1 AND 2: 0
CLINICAL INTERPRETATION OF PHQ2 SCORE: NO FURTHER SCREENING NEEDED
SUM OF ALL RESPONSES TO PHQ9 QUESTIONS 1 AND 2: 0
1. LITTLE INTEREST OR PLEASURE IN DOING THINGS: NOT AT ALL
CLINICAL INTERPRETATION OF PHQ9 SCORE: NO FURTHER SCREENING NEEDED
2. FEELING DOWN, DEPRESSED OR HOPELESS: NOT AT ALL

## 2020-06-21 PROBLEM — B35.1 ONYCHOMYCOSIS OF TOENAIL: Status: ACTIVE | Noted: 2020-06-21

## 2020-06-25 ENCOUNTER — TELEPHONE (OUTPATIENT)
Dept: SCHEDULING | Age: 85
End: 2020-06-25

## 2020-06-25 DIAGNOSIS — G47.00 INSOMNIA, UNSPECIFIED TYPE: ICD-10-CM

## 2020-06-26 RX ORDER — PHENOL 1.4 %
10 AEROSOL, SPRAY (ML) MUCOUS MEMBRANE NIGHTLY
Qty: 30 TABLET | Refills: 3 | Status: SHIPPED | OUTPATIENT
Start: 2020-06-26 | End: 2020-09-18 | Stop reason: SDUPTHER

## 2020-07-20 ENCOUNTER — TELEPHONE (OUTPATIENT)
Dept: SCHEDULING | Age: 85
End: 2020-07-20

## 2020-07-20 ENCOUNTER — LAB SERVICES (OUTPATIENT)
Dept: LAB | Age: 85
End: 2020-07-20

## 2020-07-20 DIAGNOSIS — R60.0 BILATERAL LOWER EXTREMITY EDEMA: ICD-10-CM

## 2020-07-20 DIAGNOSIS — Z86.73 HISTORY OF CVA (CEREBROVASCULAR ACCIDENT): ICD-10-CM

## 2020-07-20 DIAGNOSIS — E78.5 DYSLIPIDEMIA: ICD-10-CM

## 2020-07-20 PROCEDURE — 36415 COLL VENOUS BLD VENIPUNCTURE: CPT

## 2020-07-21 LAB
ALBUMIN SERPL-MCNC: 3.6 G/DL (ref 3.6–5.1)
ALBUMIN/GLOB SERPL: 0.9 {RATIO} (ref 1–2.4)
ALP SERPL-CCNC: 101 UNITS/L (ref 45–117)
ALT SERPL-CCNC: 18 UNITS/L
ANION GAP SERPL CALC-SCNC: 11 MMOL/L (ref 10–20)
AST SERPL-CCNC: 26 UNITS/L
BILIRUB SERPL-MCNC: 0.6 MG/DL (ref 0.2–1)
BUN SERPL-MCNC: 18 MG/DL (ref 6–20)
BUN/CREAT SERPL: 25 (ref 7–25)
CALCIUM SERPL-MCNC: 9 MG/DL (ref 8.4–10.2)
CHLORIDE SERPL-SCNC: 105 MMOL/L (ref 98–107)
CHOLEST SERPL-MCNC: 221 MG/DL
CHOLEST/HDLC SERPL: 3.4 {RATIO}
CO2 SERPL-SCNC: 28 MMOL/L (ref 21–32)
CREAT SERPL-MCNC: 0.72 MG/DL (ref 0.51–0.95)
ERYTHROCYTE [DISTWIDTH] IN BLOOD: 14.1 % (ref 11–15)
GLOBULIN SER-MCNC: 3.9 G/DL (ref 2–4)
GLUCOSE SERPL-MCNC: 77 MG/DL (ref 65–99)
HBA1C MFR BLD: 5.3 % (ref 4.5–5.6)
HCT VFR BLD CALC: 38.9 % (ref 36–46.5)
HDLC SERPL-MCNC: 65 MG/DL
HGB BLD-MCNC: 11.4 G/DL (ref 12–15.5)
LDLC SERPL CALC-MCNC: 139 MG/DL
LENGTH OF FAST TIME PATIENT: ABNORMAL HRS
LENGTH OF FAST TIME PATIENT: ABNORMAL HRS
MCH RBC QN AUTO: 28.3 PG (ref 26–34)
MCHC RBC AUTO-ENTMCNC: 29.3 G/DL (ref 32–36.5)
MCV RBC AUTO: 96.5 FL (ref 78–100)
NONHDLC SERPL-MCNC: 156 MG/DL
NRBC BLD MANUAL-RTO: 0 /100 WBC
PLATELET # BLD: 263 K/MCL (ref 140–450)
POTASSIUM SERPL-SCNC: 5.4 MMOL/L (ref 3.4–5.1)
PROT SERPL-MCNC: 7.5 G/DL (ref 6.4–8.2)
RBC # BLD: 4.03 MIL/MCL (ref 4–5.2)
SODIUM SERPL-SCNC: 139 MMOL/L (ref 135–145)
TRIGL SERPL-MCNC: 84 MG/DL
TSH SERPL-ACNC: 2.08 MCUNITS/ML (ref 0.35–5)
WBC # BLD: 4.3 K/MCL (ref 4.2–11)

## 2020-07-22 ENCOUNTER — HOSPITAL ENCOUNTER (OUTPATIENT)
Dept: CARDIOLOGY CLINIC | Facility: HOSPITAL | Age: 85
Discharge: HOME | End: 2020-07-22
Attending: INTERNAL MEDICINE
Payer: MEDICARE

## 2020-07-22 ENCOUNTER — HOSPITAL ENCOUNTER (OUTPATIENT)
Dept: CV DIAGNOSTICS | Facility: HOSPITAL | Age: 85
Discharge: HOME | End: 2020-07-22
Attending: INTERNAL MEDICINE
Payer: MEDICARE

## 2020-07-22 DIAGNOSIS — M79.605 PAIN OF LEFT LOWER EXTREMITY: ICD-10-CM

## 2020-07-22 DIAGNOSIS — I63.9 CEREBROVASCULAR ACCIDENT (CVA), UNSPECIFIED MECHANISM (HCC): ICD-10-CM

## 2020-07-22 DIAGNOSIS — R60.9 EDEMA, UNSPECIFIED TYPE: ICD-10-CM

## 2020-07-22 PROCEDURE — 93306 TTE W/DOPPLER COMPLETE: CPT | Performed by: INTERNAL MEDICINE

## 2020-07-22 PROCEDURE — 93978 VASCULAR STUDY: CPT | Performed by: INTERNAL MEDICINE

## 2020-07-23 ENCOUNTER — TELEPHONE (OUTPATIENT)
Dept: CARDIOLOGY | Age: 85
End: 2020-07-23

## 2020-07-24 ENCOUNTER — TELEPHONE (OUTPATIENT)
Dept: CARDIOLOGY | Age: 85
End: 2020-07-24

## 2020-07-24 DIAGNOSIS — M79.605 PAIN OF LEFT LOWER EXTREMITY: ICD-10-CM

## 2020-07-24 DIAGNOSIS — R60.9 EDEMA, UNSPECIFIED TYPE: ICD-10-CM

## 2020-07-24 DIAGNOSIS — I63.9 CEREBROVASCULAR ACCIDENT (CVA), UNSPECIFIED MECHANISM (CMD): ICD-10-CM

## 2020-07-24 PROCEDURE — X1094 US VASC LARGE VESSELS DUPLEX COMPLETE NON EXTREMITY: HCPCS | Performed by: INTERNAL MEDICINE

## 2020-08-11 DIAGNOSIS — F32.0 MILD MAJOR DEPRESSION (CMD): ICD-10-CM

## 2020-08-11 RX ORDER — ESCITALOPRAM OXALATE 5 MG/1
5 TABLET ORAL DAILY
Qty: 30 TABLET | Refills: 2 | Status: SHIPPED | OUTPATIENT
Start: 2020-08-11 | End: 2020-09-18 | Stop reason: SDUPTHER

## 2020-09-18 ENCOUNTER — OFFICE VISIT (OUTPATIENT)
Dept: FAMILY MEDICINE | Age: 85
End: 2020-09-18

## 2020-09-18 ENCOUNTER — LAB SERVICES (OUTPATIENT)
Dept: LAB | Age: 85
End: 2020-09-18

## 2020-09-18 VITALS
SYSTOLIC BLOOD PRESSURE: 142 MMHG | HEART RATE: 65 BPM | DIASTOLIC BLOOD PRESSURE: 80 MMHG | WEIGHT: 137.24 LBS | BODY MASS INDEX: 28.81 KG/M2 | HEIGHT: 58 IN | OXYGEN SATURATION: 99 %

## 2020-09-18 DIAGNOSIS — M25.552 LEFT HIP PAIN: Primary | ICD-10-CM

## 2020-09-18 DIAGNOSIS — M89.8X5 PAIN OF LEFT FEMUR: ICD-10-CM

## 2020-09-18 DIAGNOSIS — F32.0 MILD MAJOR DEPRESSION (CMD): ICD-10-CM

## 2020-09-18 DIAGNOSIS — E78.2 MIXED HYPERLIPIDEMIA: ICD-10-CM

## 2020-09-18 DIAGNOSIS — E87.5 HYPERKALEMIA: ICD-10-CM

## 2020-09-18 DIAGNOSIS — G47.00 INSOMNIA, UNSPECIFIED TYPE: ICD-10-CM

## 2020-09-18 DIAGNOSIS — G62.9 NEUROPATHY: ICD-10-CM

## 2020-09-18 PROCEDURE — 99214 OFFICE O/P EST MOD 30 MIN: CPT | Performed by: FAMILY MEDICINE

## 2020-09-18 PROCEDURE — 36415 COLL VENOUS BLD VENIPUNCTURE: CPT

## 2020-09-18 RX ORDER — PHENOL 1.4 %
10 AEROSOL, SPRAY (ML) MUCOUS MEMBRANE NIGHTLY
Qty: 30 TABLET | Refills: 3 | Status: SHIPPED | OUTPATIENT
Start: 2020-09-18 | End: 2021-05-13 | Stop reason: SDUPTHER

## 2020-09-18 RX ORDER — ESCITALOPRAM OXALATE 5 MG/1
5 TABLET ORAL DAILY
Qty: 30 TABLET | Refills: 2 | Status: SHIPPED | OUTPATIENT
Start: 2020-09-18 | End: 2020-09-22 | Stop reason: SDUPTHER

## 2020-09-18 RX ORDER — ATORVASTATIN CALCIUM 20 MG/1
20 TABLET, FILM COATED ORAL DAILY
Qty: 90 TABLET | Refills: 3 | Status: SHIPPED | OUTPATIENT
Start: 2020-09-18 | End: 2020-12-08 | Stop reason: SDUPTHER

## 2020-09-18 RX ORDER — GABAPENTIN 300 MG/1
300 CAPSULE ORAL DAILY
Qty: 30 CAPSULE | Refills: 2 | Status: SHIPPED | OUTPATIENT
Start: 2020-09-18 | End: 2020-12-08 | Stop reason: SDUPTHER

## 2020-09-18 SDOH — HEALTH STABILITY: MENTAL HEALTH: HOW OFTEN DO YOU HAVE A DRINK CONTAINING ALCOHOL?: NEVER

## 2020-09-18 ASSESSMENT — PATIENT HEALTH QUESTIONNAIRE - PHQ9
CLINICAL INTERPRETATION OF PHQ2 SCORE: NO FURTHER SCREENING NEEDED
1. LITTLE INTEREST OR PLEASURE IN DOING THINGS: NOT AT ALL
SUM OF ALL RESPONSES TO PHQ9 QUESTIONS 1 AND 2: 0
SUM OF ALL RESPONSES TO PHQ9 QUESTIONS 1 AND 2: 0
CLINICAL INTERPRETATION OF PHQ9 SCORE: NO FURTHER SCREENING NEEDED
2. FEELING DOWN, DEPRESSED OR HOPELESS: NOT AT ALL

## 2020-09-19 DIAGNOSIS — E87.5 SERUM POTASSIUM ELEVATED: Primary | ICD-10-CM

## 2020-09-19 LAB
ANION GAP SERPL CALC-SCNC: 9 MMOL/L (ref 10–20)
BUN SERPL-MCNC: 19 MG/DL (ref 6–20)
BUN/CREAT SERPL: 23 (ref 7–25)
CALCIUM SERPL-MCNC: 9.8 MG/DL (ref 8.4–10.2)
CHLORIDE SERPL-SCNC: 109 MMOL/L (ref 98–107)
CO2 SERPL-SCNC: 32 MMOL/L (ref 21–32)
CREAT SERPL-MCNC: 0.84 MG/DL (ref 0.51–0.95)
GLUCOSE SERPL-MCNC: 91 MG/DL (ref 65–99)
LENGTH OF FAST TIME PATIENT: ABNORMAL HRS
POTASSIUM SERPL-SCNC: 5.7 MMOL/L (ref 3.4–5.1)
SODIUM SERPL-SCNC: 144 MMOL/L (ref 135–145)

## 2020-09-22 ENCOUNTER — TELEPHONE (OUTPATIENT)
Dept: SCHEDULING | Age: 85
End: 2020-09-22

## 2020-09-22 DIAGNOSIS — F32.0 MILD MAJOR DEPRESSION (CMD): ICD-10-CM

## 2020-09-22 RX ORDER — ESCITALOPRAM OXALATE 5 MG/1
5 TABLET ORAL DAILY
Qty: 30 TABLET | Refills: 2 | Status: SHIPPED | OUTPATIENT
Start: 2020-09-22 | End: 2020-12-08 | Stop reason: SDUPTHER

## 2020-09-23 ENCOUNTER — LAB SERVICES (OUTPATIENT)
Dept: LAB | Age: 85
End: 2020-09-23

## 2020-09-23 DIAGNOSIS — E87.5 SERUM POTASSIUM ELEVATED: ICD-10-CM

## 2020-09-23 LAB — POTASSIUM SERPL-SCNC: 4.4 MMOL/L (ref 3.4–5.1)

## 2020-10-17 ENCOUNTER — TELEPHONE (OUTPATIENT)
Dept: SCHEDULING | Age: 85
End: 2020-10-17

## 2020-10-19 ENCOUNTER — APPOINTMENT (OUTPATIENT)
Dept: INTERNAL MEDICINE | Age: 85
End: 2020-10-19

## 2020-12-08 ENCOUNTER — TELEPHONIC VISIT (OUTPATIENT)
Dept: FAMILY MEDICINE | Age: 85
End: 2020-12-08

## 2020-12-08 DIAGNOSIS — M16.10 ARTHRITIS PAIN OF HIP: ICD-10-CM

## 2020-12-08 DIAGNOSIS — R60.0 BILATERAL LOWER EXTREMITY EDEMA: ICD-10-CM

## 2020-12-08 DIAGNOSIS — E78.2 MIXED HYPERLIPIDEMIA: ICD-10-CM

## 2020-12-08 DIAGNOSIS — F32.0 MILD MAJOR DEPRESSION (CMD): ICD-10-CM

## 2020-12-08 DIAGNOSIS — G62.9 NEUROPATHY: ICD-10-CM

## 2020-12-08 DIAGNOSIS — E78.5 DYSLIPIDEMIA: Primary | ICD-10-CM

## 2020-12-08 DIAGNOSIS — M16.12 PRIMARY OSTEOARTHRITIS OF LEFT HIP: ICD-10-CM

## 2020-12-08 DIAGNOSIS — I77.811 ABDOMINAL AORTIC ECTASIA (CMD): ICD-10-CM

## 2020-12-08 PROCEDURE — 99443 TELEPHONE E&M BY PHYSICIAN EST PT NOT ORIG PREV 7 DAYS 21-30 MIN: CPT | Performed by: FAMILY MEDICINE

## 2020-12-08 RX ORDER — ATORVASTATIN CALCIUM 20 MG/1
20 TABLET, FILM COATED ORAL DAILY
Qty: 90 TABLET | Refills: 3 | Status: SHIPPED | OUTPATIENT
Start: 2020-12-08 | End: 2021-05-13 | Stop reason: SDUPTHER

## 2020-12-08 RX ORDER — GABAPENTIN 300 MG/1
300 CAPSULE ORAL DAILY
Qty: 30 CAPSULE | Refills: 2 | Status: SHIPPED | OUTPATIENT
Start: 2020-12-08 | End: 2021-02-02 | Stop reason: SDUPTHER

## 2020-12-08 RX ORDER — ESCITALOPRAM OXALATE 5 MG/1
5 TABLET ORAL DAILY
Qty: 30 TABLET | Refills: 2 | Status: SHIPPED | OUTPATIENT
Start: 2020-12-08 | End: 2021-03-11 | Stop reason: SDUPTHER

## 2020-12-08 SDOH — HEALTH STABILITY: MENTAL HEALTH: HOW OFTEN DO YOU HAVE A DRINK CONTAINING ALCOHOL?: NEVER

## 2020-12-08 ASSESSMENT — PATIENT HEALTH QUESTIONNAIRE - PHQ9
1. LITTLE INTEREST OR PLEASURE IN DOING THINGS: NOT AT ALL
SUM OF ALL RESPONSES TO PHQ9 QUESTIONS 1 AND 2: 0
CLINICAL INTERPRETATION OF PHQ9 SCORE: NO FURTHER SCREENING NEEDED
SUM OF ALL RESPONSES TO PHQ9 QUESTIONS 1 AND 2: 0
2. FEELING DOWN, DEPRESSED OR HOPELESS: NOT AT ALL
CLINICAL INTERPRETATION OF PHQ2 SCORE: NO FURTHER SCREENING NEEDED

## 2020-12-18 ENCOUNTER — TELEPHONE (OUTPATIENT)
Dept: FAMILY MEDICINE | Age: 85
End: 2020-12-18

## 2020-12-18 ENCOUNTER — TELEPHONE (OUTPATIENT)
Dept: SCHEDULING | Age: 85
End: 2020-12-18

## 2020-12-18 DIAGNOSIS — M16.12 OSTEOARTHRITIS OF LEFT HIP, UNSPECIFIED OSTEOARTHRITIS TYPE: Primary | ICD-10-CM

## 2021-01-01 ENCOUNTER — EXTERNAL RECORD (OUTPATIENT)
Dept: HEALTH INFORMATION MANAGEMENT | Facility: OTHER | Age: 86
End: 2021-01-01

## 2021-01-26 ENCOUNTER — TELEPHONE (OUTPATIENT)
Dept: SCHEDULING | Age: 86
End: 2021-01-26

## 2021-02-02 ENCOUNTER — V-VISIT (OUTPATIENT)
Dept: FAMILY MEDICINE | Age: 86
End: 2021-02-02

## 2021-02-02 DIAGNOSIS — E78.5 DYSLIPIDEMIA: ICD-10-CM

## 2021-02-02 DIAGNOSIS — G62.9 NEUROPATHY: ICD-10-CM

## 2021-02-02 DIAGNOSIS — R10.13 EPIGASTRIC PAIN: Primary | ICD-10-CM

## 2021-02-02 DIAGNOSIS — K21.9 GASTROESOPHAGEAL REFLUX DISEASE, UNSPECIFIED WHETHER ESOPHAGITIS PRESENT: ICD-10-CM

## 2021-02-02 PROCEDURE — 99214 OFFICE O/P EST MOD 30 MIN: CPT | Performed by: FAMILY MEDICINE

## 2021-02-02 RX ORDER — FAMOTIDINE 20 MG/1
20 TABLET, FILM COATED ORAL 2 TIMES DAILY
Qty: 60 TABLET | Refills: 0 | Status: SHIPPED | OUTPATIENT
Start: 2021-02-02 | End: 2021-04-08

## 2021-02-02 RX ORDER — GABAPENTIN 300 MG/1
300 CAPSULE ORAL DAILY
Qty: 30 CAPSULE | Refills: 2 | Status: SHIPPED | OUTPATIENT
Start: 2021-02-02 | End: 2021-05-13 | Stop reason: SDUPTHER

## 2021-02-02 SDOH — HEALTH STABILITY: MENTAL HEALTH: HOW OFTEN DO YOU HAVE A DRINK CONTAINING ALCOHOL?: NEVER

## 2021-02-02 ASSESSMENT — PATIENT HEALTH QUESTIONNAIRE - PHQ9
CLINICAL INTERPRETATION OF PHQ9 SCORE: NO FURTHER SCREENING NEEDED
SUM OF ALL RESPONSES TO PHQ9 QUESTIONS 1 AND 2: 0
CLINICAL INTERPRETATION OF PHQ2 SCORE: NO FURTHER SCREENING NEEDED
2. FEELING DOWN, DEPRESSED OR HOPELESS: NOT AT ALL
1. LITTLE INTEREST OR PLEASURE IN DOING THINGS: NOT AT ALL
SUM OF ALL RESPONSES TO PHQ9 QUESTIONS 1 AND 2: 0

## 2021-02-03 ENCOUNTER — TELEPHONE (OUTPATIENT)
Dept: SCHEDULING | Age: 86
End: 2021-02-03

## 2021-02-03 PROCEDURE — 87338 HPYLORI STOOL AG IA: CPT | Performed by: CLINICAL MEDICAL LABORATORY

## 2021-02-05 ENCOUNTER — TELEPHONE (OUTPATIENT)
Dept: SCHEDULING | Age: 86
End: 2021-02-05

## 2021-02-09 ENCOUNTER — TELEPHONE (OUTPATIENT)
Dept: SCHEDULING | Age: 86
End: 2021-02-09

## 2021-02-09 LAB — H PYLORI AG STL QL IA: NEGATIVE

## 2021-03-05 ENCOUNTER — TELEPHONE (OUTPATIENT)
Dept: SCHEDULING | Age: 86
End: 2021-03-05

## 2021-03-11 ENCOUNTER — OFFICE VISIT (OUTPATIENT)
Dept: FAMILY MEDICINE | Age: 86
End: 2021-03-11

## 2021-03-11 ENCOUNTER — LAB SERVICES (OUTPATIENT)
Dept: LAB | Age: 86
End: 2021-03-11

## 2021-03-11 VITALS
SYSTOLIC BLOOD PRESSURE: 120 MMHG | DIASTOLIC BLOOD PRESSURE: 70 MMHG | HEIGHT: 58 IN | HEART RATE: 69 BPM | OXYGEN SATURATION: 99 % | BODY MASS INDEX: 28.46 KG/M2 | WEIGHT: 135.58 LBS

## 2021-03-11 DIAGNOSIS — M16.12 PRIMARY OSTEOARTHRITIS OF LEFT HIP: ICD-10-CM

## 2021-03-11 DIAGNOSIS — Z13.1 SCREENING FOR DIABETES MELLITUS (DM): ICD-10-CM

## 2021-03-11 DIAGNOSIS — K21.9 GASTROESOPHAGEAL REFLUX DISEASE, UNSPECIFIED WHETHER ESOPHAGITIS PRESENT: ICD-10-CM

## 2021-03-11 DIAGNOSIS — R26.9 GAIT ABNORMALITY: ICD-10-CM

## 2021-03-11 DIAGNOSIS — M25.552 LEFT HIP PAIN: ICD-10-CM

## 2021-03-11 DIAGNOSIS — E78.5 DYSLIPIDEMIA: Primary | ICD-10-CM

## 2021-03-11 DIAGNOSIS — M54.50 LOW BACK PAIN RADIATING TO LEFT LEG: ICD-10-CM

## 2021-03-11 DIAGNOSIS — M25.50 MULTIPLE JOINT PAIN: ICD-10-CM

## 2021-03-11 DIAGNOSIS — I83.93 VARICOSE VEINS OF BOTH LOWER EXTREMITIES, UNSPECIFIED WHETHER COMPLICATED: ICD-10-CM

## 2021-03-11 DIAGNOSIS — M16.10 ARTHRITIS PAIN OF HIP: ICD-10-CM

## 2021-03-11 DIAGNOSIS — F32.0 MILD MAJOR DEPRESSION (CMD): ICD-10-CM

## 2021-03-11 DIAGNOSIS — E78.5 DYSLIPIDEMIA: ICD-10-CM

## 2021-03-11 DIAGNOSIS — Z13.29 SCREENING FOR THYROID DISORDER: ICD-10-CM

## 2021-03-11 DIAGNOSIS — Z23 NEED FOR VACCINATION: ICD-10-CM

## 2021-03-11 DIAGNOSIS — E78.2 MIXED HYPERLIPIDEMIA: ICD-10-CM

## 2021-03-11 DIAGNOSIS — Z13.0 SCREENING, ANEMIA, DEFICIENCY, IRON: ICD-10-CM

## 2021-03-11 DIAGNOSIS — M25.551 RIGHT HIP PAIN: ICD-10-CM

## 2021-03-11 DIAGNOSIS — M79.605 LOW BACK PAIN RADIATING TO LEFT LEG: ICD-10-CM

## 2021-03-11 DIAGNOSIS — Z13.220 LIPID SCREENING: ICD-10-CM

## 2021-03-11 DIAGNOSIS — G62.9 NEUROPATHY: ICD-10-CM

## 2021-03-11 DIAGNOSIS — I77.811 ABDOMINAL AORTIC ECTASIA (CMD): ICD-10-CM

## 2021-03-11 DIAGNOSIS — Z86.73 HISTORY OF CVA (CEREBROVASCULAR ACCIDENT): ICD-10-CM

## 2021-03-11 PROCEDURE — 80061 LIPID PANEL: CPT | Performed by: CLINICAL MEDICAL LABORATORY

## 2021-03-11 PROCEDURE — 84443 ASSAY THYROID STIM HORMONE: CPT | Performed by: CLINICAL MEDICAL LABORATORY

## 2021-03-11 PROCEDURE — 83036 HEMOGLOBIN GLYCOSYLATED A1C: CPT | Performed by: CLINICAL MEDICAL LABORATORY

## 2021-03-11 PROCEDURE — 82043 UR ALBUMIN QUANTITATIVE: CPT | Performed by: CLINICAL MEDICAL LABORATORY

## 2021-03-11 PROCEDURE — 86038 ANTINUCLEAR ANTIBODIES: CPT | Performed by: CLINICAL MEDICAL LABORATORY

## 2021-03-11 PROCEDURE — 85652 RBC SED RATE AUTOMATED: CPT | Performed by: CLINICAL MEDICAL LABORATORY

## 2021-03-11 PROCEDURE — 80053 COMPREHEN METABOLIC PANEL: CPT | Performed by: CLINICAL MEDICAL LABORATORY

## 2021-03-11 PROCEDURE — 86140 C-REACTIVE PROTEIN: CPT | Performed by: CLINICAL MEDICAL LABORATORY

## 2021-03-11 PROCEDURE — 86431 RHEUMATOID FACTOR QUANT: CPT | Performed by: CLINICAL MEDICAL LABORATORY

## 2021-03-11 PROCEDURE — 82570 ASSAY OF URINE CREATININE: CPT | Performed by: CLINICAL MEDICAL LABORATORY

## 2021-03-11 PROCEDURE — 99214 OFFICE O/P EST MOD 30 MIN: CPT | Performed by: FAMILY MEDICINE

## 2021-03-11 PROCEDURE — 85025 COMPLETE CBC W/AUTO DIFF WBC: CPT | Performed by: CLINICAL MEDICAL LABORATORY

## 2021-03-11 PROCEDURE — 36415 COLL VENOUS BLD VENIPUNCTURE: CPT

## 2021-03-11 RX ORDER — ESCITALOPRAM OXALATE 5 MG/1
5 TABLET ORAL DAILY
Qty: 90 TABLET | Refills: 2 | Status: SHIPPED | OUTPATIENT
Start: 2021-03-11 | End: 2021-05-13 | Stop reason: SDUPTHER

## 2021-03-11 SDOH — HEALTH STABILITY: MENTAL HEALTH: HOW OFTEN DO YOU HAVE A DRINK CONTAINING ALCOHOL?: NEVER

## 2021-03-11 ASSESSMENT — PATIENT HEALTH QUESTIONNAIRE - PHQ9
1. LITTLE INTEREST OR PLEASURE IN DOING THINGS: NOT AT ALL
CLINICAL INTERPRETATION OF PHQ9 SCORE: NO FURTHER SCREENING NEEDED
CLINICAL INTERPRETATION OF PHQ2 SCORE: NO FURTHER SCREENING NEEDED
SUM OF ALL RESPONSES TO PHQ9 QUESTIONS 1 AND 2: 0
2. FEELING DOWN, DEPRESSED OR HOPELESS: NOT AT ALL
SUM OF ALL RESPONSES TO PHQ9 QUESTIONS 1 AND 2: 0

## 2021-03-12 LAB
ALBUMIN SERPL-MCNC: 3.7 G/DL (ref 3.6–5.1)
ALBUMIN/GLOB SERPL: 1.2 {RATIO} (ref 1–2.4)
ALP SERPL-CCNC: 118 UNITS/L (ref 45–117)
ALT SERPL-CCNC: 21 UNITS/L
ANA SER QL IA: NEGATIVE
ANION GAP SERPL CALC-SCNC: 8 MMOL/L (ref 10–20)
AST SERPL-CCNC: 15 UNITS/L
BASOPHILS # BLD: 0 K/MCL (ref 0–0.3)
BASOPHILS NFR BLD: 1 %
BILIRUB SERPL-MCNC: 0.4 MG/DL (ref 0.2–1)
BUN SERPL-MCNC: 17 MG/DL (ref 6–20)
BUN/CREAT SERPL: 20 (ref 7–25)
CALCIUM SERPL-MCNC: 9.6 MG/DL (ref 8.4–10.2)
CHLORIDE SERPL-SCNC: 107 MMOL/L (ref 98–107)
CHOLEST SERPL-MCNC: 175 MG/DL
CHOLEST/HDLC SERPL: 2.4 {RATIO}
CO2 SERPL-SCNC: 31 MMOL/L (ref 21–32)
CREAT SERPL-MCNC: 0.85 MG/DL (ref 0.51–0.95)
CREAT UR-MCNC: 93.9 MG/DL
CRP SERPL-MCNC: <0.3 MG/DL
DEPRECATED RDW RBC: 46.9 FL (ref 39–50)
EOSINOPHIL # BLD: 0.1 K/MCL (ref 0–0.5)
EOSINOPHIL NFR BLD: 2 %
ERYTHROCYTE [DISTWIDTH] IN BLOOD: 13.4 % (ref 11–15)
ERYTHROCYTE [SEDIMENTATION RATE] IN BLOOD BY WESTERGREN METHOD: 22 MM/HR (ref 0–20)
FASTING DURATION TIME PATIENT: ABNORMAL H
FASTING DURATION TIME PATIENT: NORMAL H
GFR SERPLBLD BASED ON 1.73 SQ M-ARVRAT: 70 ML/MIN/1.73M2
GLOBULIN SER-MCNC: 3.2 G/DL (ref 2–4)
GLUCOSE SERPL-MCNC: 102 MG/DL (ref 65–99)
HBA1C MFR BLD: 5.8 % (ref 4.5–5.6)
HCT VFR BLD CALC: 39.2 % (ref 36–46.5)
HDLC SERPL-MCNC: 74 MG/DL
HGB BLD-MCNC: 11.9 G/DL (ref 12–15.5)
IMM GRANULOCYTES # BLD AUTO: 0 K/MCL (ref 0–0.2)
IMM GRANULOCYTES # BLD: 0 %
LDLC SERPL CALC-MCNC: 88 MG/DL
LYMPHOCYTES # BLD: 2.5 K/MCL (ref 1–4)
LYMPHOCYTES NFR BLD: 43 %
MCH RBC QN AUTO: 29 PG (ref 26–34)
MCHC RBC AUTO-ENTMCNC: 30.4 G/DL (ref 32–36.5)
MCV RBC AUTO: 95.4 FL (ref 78–100)
MICROALBUMIN UR-MCNC: 23.9 MG/DL
MICROALBUMIN/CREAT UR: 254.5 MG/G
MONOCYTES # BLD: 0.5 K/MCL (ref 0.3–0.9)
MONOCYTES NFR BLD: 9 %
NEUTROPHILS # BLD: 2.6 K/MCL (ref 1.8–7.7)
NEUTROPHILS NFR BLD: 45 %
NONHDLC SERPL-MCNC: 101 MG/DL
NRBC BLD MANUAL-RTO: 0 /100 WBC
PLATELET # BLD AUTO: 273 K/MCL (ref 140–450)
POTASSIUM SERPL-SCNC: 4.2 MMOL/L (ref 3.4–5.1)
PROT SERPL-MCNC: 6.9 G/DL (ref 6.4–8.2)
RBC # BLD: 4.11 MIL/MCL (ref 4–5.2)
RHEUMATOID FACT SER NEPH-ACNC: <10 UNITS/ML
SODIUM SERPL-SCNC: 142 MMOL/L (ref 135–145)
TRIGL SERPL-MCNC: 64 MG/DL
TSH SERPL-ACNC: 2 MCUNITS/ML (ref 0.35–5)
WBC # BLD: 5.8 K/MCL (ref 4.2–11)

## 2021-04-01 ENCOUNTER — HOSPITAL ENCOUNTER (OUTPATIENT)
Dept: MRI IMAGING | Age: 86
Discharge: HOME | End: 2021-04-01
Attending: FAMILY MEDICINE

## 2021-04-01 DIAGNOSIS — M16.12 PRIMARY OSTEOARTHRITIS OF LEFT HIP: ICD-10-CM

## 2021-04-01 DIAGNOSIS — M16.10 ARTHRITIS PAIN OF HIP: ICD-10-CM

## 2021-04-01 DIAGNOSIS — M25.552 LEFT HIP PAIN: ICD-10-CM

## 2021-04-01 DIAGNOSIS — M79.605 LOW BACK PAIN RADIATING TO LEFT LEG: ICD-10-CM

## 2021-04-01 DIAGNOSIS — M54.50 LOW BACK PAIN RADIATING TO LEFT LEG: ICD-10-CM

## 2021-04-01 PROCEDURE — 72148 MRI LUMBAR SPINE W/O DYE: CPT

## 2021-04-06 ENCOUNTER — TELEPHONE (OUTPATIENT)
Dept: SCHEDULING | Age: 86
End: 2021-04-06

## 2021-04-08 DIAGNOSIS — K21.9 GASTROESOPHAGEAL REFLUX DISEASE, UNSPECIFIED WHETHER ESOPHAGITIS PRESENT: ICD-10-CM

## 2021-04-08 RX ORDER — FAMOTIDINE 20 MG/1
TABLET, FILM COATED ORAL
Qty: 60 TABLET | Refills: 0 | Status: SHIPPED | OUTPATIENT
Start: 2021-04-08 | End: 2021-05-13 | Stop reason: SDUPTHER

## 2021-04-20 ENCOUNTER — IMMUNIZATION (OUTPATIENT)
Dept: LAB | Age: 86
End: 2021-04-20

## 2021-04-20 DIAGNOSIS — Z23 NEED FOR VACCINATION: Primary | ICD-10-CM

## 2021-04-20 PROCEDURE — 0011A COVID-19 MODERNA VACCINE: CPT

## 2021-04-20 PROCEDURE — 91301 COVID-19 MODERNA VACCINE: CPT

## 2021-05-13 ENCOUNTER — OFFICE VISIT (OUTPATIENT)
Dept: FAMILY MEDICINE | Age: 86
End: 2021-05-13

## 2021-05-13 DIAGNOSIS — K21.9 GASTROESOPHAGEAL REFLUX DISEASE, UNSPECIFIED WHETHER ESOPHAGITIS PRESENT: ICD-10-CM

## 2021-05-13 DIAGNOSIS — G62.9 NEUROPATHY: ICD-10-CM

## 2021-05-13 DIAGNOSIS — G47.00 INSOMNIA, UNSPECIFIED TYPE: ICD-10-CM

## 2021-05-13 DIAGNOSIS — F32.0 MILD MAJOR DEPRESSION (CMD): ICD-10-CM

## 2021-05-13 DIAGNOSIS — E78.2 MIXED HYPERLIPIDEMIA: ICD-10-CM

## 2021-05-13 DIAGNOSIS — R60.0 BILATERAL LOWER EXTREMITY EDEMA: Primary | ICD-10-CM

## 2021-05-13 PROCEDURE — 99214 OFFICE O/P EST MOD 30 MIN: CPT | Performed by: FAMILY MEDICINE

## 2021-05-13 RX ORDER — PHENOL 1.4 %
10 AEROSOL, SPRAY (ML) MUCOUS MEMBRANE NIGHTLY
Qty: 90 TABLET | Refills: 3 | Status: SHIPPED | OUTPATIENT
Start: 2021-05-13

## 2021-05-13 RX ORDER — FAMOTIDINE 20 MG/1
20 TABLET, FILM COATED ORAL 2 TIMES DAILY
Qty: 180 TABLET | Refills: 3 | Status: SHIPPED | OUTPATIENT
Start: 2021-05-13

## 2021-05-13 RX ORDER — FUROSEMIDE 20 MG/1
20 TABLET ORAL DAILY
Qty: 90 TABLET | Refills: 3 | Status: SHIPPED | OUTPATIENT
Start: 2021-05-13

## 2021-05-13 RX ORDER — GABAPENTIN 300 MG/1
300 CAPSULE ORAL DAILY
Qty: 90 CAPSULE | Refills: 3 | Status: SHIPPED | OUTPATIENT
Start: 2021-05-13

## 2021-05-13 RX ORDER — ATORVASTATIN CALCIUM 20 MG/1
20 TABLET, FILM COATED ORAL DAILY
Qty: 90 TABLET | Refills: 3 | Status: SHIPPED | OUTPATIENT
Start: 2021-05-13

## 2021-05-13 RX ORDER — ESCITALOPRAM OXALATE 5 MG/1
5 TABLET ORAL DAILY
Qty: 90 TABLET | Refills: 3 | Status: SHIPPED | OUTPATIENT
Start: 2021-05-13

## 2021-05-13 ASSESSMENT — PAIN SCALES - GENERAL: PAINLEVEL: 0

## 2021-05-16 PROBLEM — G62.9 NEUROPATHY: Status: ACTIVE | Noted: 2021-05-16

## 2021-05-16 PROBLEM — G47.00 INSOMNIA: Status: ACTIVE | Noted: 2021-05-16

## 2021-05-16 PROBLEM — F32.0 MILD MAJOR DEPRESSION (CMD): Status: ACTIVE | Noted: 2021-05-16

## 2021-05-16 PROBLEM — E78.2 MIXED HYPERLIPIDEMIA: Status: ACTIVE | Noted: 2021-05-16

## 2021-05-16 PROBLEM — K21.9 GASTROESOPHAGEAL REFLUX DISEASE: Status: ACTIVE | Noted: 2021-05-16

## 2021-05-16 ASSESSMENT — ENCOUNTER SYMPTOMS
CONSTIPATION: 0
ABDOMINAL PAIN: 0
WOUND: 0
DIARRHEA: 0
WHEEZING: 0
NUMBNESS: 0
DIZZINESS: 0
CHILLS: 0
EYE DISCHARGE: 0
RHINORRHEA: 0
SORE THROAT: 0
FEVER: 0
SHORTNESS OF BREATH: 0
WEAKNESS: 0
VOMITING: 0
EYE PAIN: 0
CONFUSION: 0
HEADACHES: 0
SLEEP DISTURBANCE: 0
COUGH: 0
NAUSEA: 0
ACTIVITY CHANGE: 0
AGITATION: 0

## 2021-05-18 ENCOUNTER — IMMUNIZATION (OUTPATIENT)
Dept: LAB | Age: 86
End: 2021-05-18
Attending: HOSPITALIST

## 2021-05-18 DIAGNOSIS — Z23 NEED FOR VACCINATION: Primary | ICD-10-CM

## 2021-05-18 PROCEDURE — 0012A COVID-19 MODERNA VACCINE: CPT | Performed by: HOSPITALIST

## 2021-05-18 PROCEDURE — 91301 COVID-19 MODERNA VACCINE: CPT | Performed by: HOSPITALIST

## 2021-05-24 ENCOUNTER — TELEPHONE (OUTPATIENT)
Dept: SCHEDULING | Age: 86
End: 2021-05-24

## 2021-05-25 VITALS
DIASTOLIC BLOOD PRESSURE: 66 MMHG | HEART RATE: 70 BPM | OXYGEN SATURATION: 99 % | SYSTOLIC BLOOD PRESSURE: 134 MMHG | WEIGHT: 142.64 LBS | HEIGHT: 58 IN | BODY MASS INDEX: 29.94 KG/M2

## 2021-05-27 ENCOUNTER — LAB SERVICES (OUTPATIENT)
Dept: URGENT CARE | Age: 86
End: 2021-05-27

## 2021-05-27 DIAGNOSIS — Z01.812 ENCOUNTER FOR SCREENING LABORATORY TESTING FOR COVID-19 VIRUS IN ASYMPTOMATIC PATIENT: Primary | ICD-10-CM

## 2021-05-27 DIAGNOSIS — Z11.52 ENCOUNTER FOR SCREENING LABORATORY TESTING FOR COVID-19 VIRUS IN ASYMPTOMATIC PATIENT: Primary | ICD-10-CM

## 2021-05-27 PROCEDURE — U0003 INFECTIOUS AGENT DETECTION BY NUCLEIC ACID (DNA OR RNA); SEVERE ACUTE RESPIRATORY SYNDROME CORONAVIRUS 2 (SARS-COV-2) (CORONAVIRUS DISEASE [COVID-19]), AMPLIFIED PROBE TECHNIQUE, MAKING USE OF HIGH THROUGHPUT TECHNOLOGIES AS DESCRIBED BY CMS-2020-01-R: HCPCS | Performed by: CLINICAL MEDICAL LABORATORY

## 2021-05-27 PROCEDURE — U0005 INFEC AGEN DETEC AMPLI PROBE: HCPCS | Performed by: CLINICAL MEDICAL LABORATORY

## 2021-05-28 LAB
SARS-COV-2 RNA RESP QL NAA+PROBE: NOT DETECTED
SERVICE CMNT-IMP: NORMAL
SERVICE CMNT-IMP: NORMAL

## 2021-08-18 ENCOUNTER — TELEPHONE (OUTPATIENT)
Dept: FAMILY MEDICINE | Age: 86
End: 2021-08-18

## 2021-09-03 NOTE — ED ADULT NURSE NOTE - BP NONINVASIVE SYSTOLIC (MM HG)
----- Message from Shira Ellison CNA sent at 9/2/2021  5:47 PM CDT -----  Regarding: Lab Orders  Patient has an appointment for lab on 09/07/2021; There are no lab orders in Epic, please enter orders prior to patient's appointment.   If patient is not due for lab work please respond to this message or notify the Lab Department.  Thank you.                       [phone]

## 2022-02-22 ENCOUNTER — TELEPHONE (OUTPATIENT)
Dept: FAMILY MEDICINE | Age: 87
End: 2022-02-22
